# Patient Record
Sex: MALE | Race: WHITE | URBAN - METROPOLITAN AREA
[De-identification: names, ages, dates, MRNs, and addresses within clinical notes are randomized per-mention and may not be internally consistent; named-entity substitution may affect disease eponyms.]

---

## 2017-03-06 ENCOUNTER — OFFICE VISIT (OUTPATIENT)
Dept: PODIATRY | Facility: CLINIC | Age: 67
End: 2017-03-06
Payer: COMMERCIAL

## 2017-03-06 VITALS
BODY MASS INDEX: 28.98 KG/M2 | DIASTOLIC BLOOD PRESSURE: 76 MMHG | WEIGHT: 238 LBS | SYSTOLIC BLOOD PRESSURE: 122 MMHG | HEIGHT: 76 IN

## 2017-03-06 DIAGNOSIS — M20.42 HAMMER TOES OF BOTH FEET: ICD-10-CM

## 2017-03-06 DIAGNOSIS — M79.672 FOOT PAIN, LEFT: ICD-10-CM

## 2017-03-06 DIAGNOSIS — M20.41 HAMMER TOES OF BOTH FEET: ICD-10-CM

## 2017-03-06 DIAGNOSIS — Q66.72 PES CAVUS OF LEFT FOOT: ICD-10-CM

## 2017-03-06 DIAGNOSIS — L84 CALLUS OF FOOT: Primary | ICD-10-CM

## 2017-03-06 PROCEDURE — 99203 OFFICE O/P NEW LOW 30 MIN: CPT | Performed by: PODIATRIST

## 2017-03-06 NOTE — NURSING NOTE
"Chief Complaint   Patient presents with     Foot Problems     plantar wart x1.5 years of left foot        Initial /76 (BP Location: Right arm, Cuff Size: Adult Regular)  Ht 6' 4\" (1.93 m)  Wt 238 lb (108 kg)  BMI 28.97 kg/m2 Estimated body mass index is 28.97 kg/(m^2) as calculated from the following:    Height as of this encounter: 6' 4\" (1.93 m).    Weight as of this encounter: 238 lb (108 kg).  Medication Reconciliation: complete   Danielle Harris MA      "

## 2017-03-06 NOTE — MR AVS SNAPSHOT
After Visit Summary   3/6/2017    Ramez Fairchild    MRN: [id number]           Patient Information     Date Of Birth          1950        Visit Information        Provider Department      3/6/2017 9:15 AM Dwain Sunshine DPM [facility]        Today's Diagnoses     Callus of foot    -  1    Foot pain, left        Pes cavus of left foot        Hammer toes of both feet          Care Instructions    LUIS ALFREDO SHOES LOCATIONS    [place]  [address] [place] [address]  [phone]   [address]  [phone] [place]  [address] [facility]  [phone]   [place]  [address]  [phone]   [place]  [address]  [phone] [place]  [address] [place] [address] [place].  [phone]   [place]  [address]  [phone]   [place]  [address]  [phone] [place]  [address] [facility] [phone]         Calluses, Corns, IPKs, Porokeratosis    When there is excessive friction or pressure on the skin, the body responds by making the skin thicker to protect the deeper structures from becoming exposed. While this works well to protect the deeper structures, the thickened skin can increase pressure and pain.    Flat, diffuse thickening are simple calluses and they are usually caused by friction.  Often these are the result of rubbing on a shoe or going barefoot.    Calluses with a central core between the toes are called corns. These result from prominent joints on adjacent toes rubbing together. Theses are a symptom of bone malalignment and will always recur unless the underlying bones are addressed surgically.    Calluses with a central core on the ball of the foot are usually IPKs (intractable plantar keratosis). These are caused by excessive pressure from the metatarsals, the bones that make up the ball of the foot. Often one of these bones is too long or too prominent.  Again, these will always recur unless the  underlying bone issue is addressed. There is no cure for these. They will either go away by themselves, recur, or more could develop.    Regardless of what the diagnosis, most of these lesions can be kept comfortable with routine maintenance.   1. File them down with a pumice stone or callus file a couple times a week.   2. An electric callus removing device. Amope Pedi Perfect Electronic Pedicure Foot File and Callus Remover can be a good option.   3. Lotion can be applied to soften the callus. A urea based cream such as Kersal or Vanicream or thicker cream with shea butter are good options.  4. Toe spacers or toe covers can be used for corns, gel pads can be used for other lesions on the bottom of the foot.   If there is a surgical pathology noted, such as a prominent bone, often this needs to be addressed surgically to minimize recurrence. However, sometimes the lesion simply migrates to another spot after surgery, so it is not a guaranteed cure.         DR. OLIVIA'S SCHEDULE:        Monday & Friday AM - [facility] Wednesday - [facility]   [address] [place] [address] [address] [place] [address]   P: [phone] P: [phone]   F: [phone] F:[phone]       Tuesday - Surgery Thursday [facility]   [phone] [address] [place] [address]   Appointment Scheduling Line: P: [phone] F: [phone]     FYI: Our new schedule at [place] on Wednesday is from 7 AM - 2 PM.        Body Mass Index (BMI)    Many things can cause foot and ankle problems. Foot structure, activity level, foot mechanics and injuries are common causes of pain.    One very important issue that often goes unmentioned, is body weight.  Extra weight can cause increased stress on muscles, ligaments, bones and tendons. Sometimes just a few extra pounds is all it takes to put one over her/his threshold. Without reducing that stress, it can be difficult to  "alleviate pain.      Some people are uncomfortable addressing this issue, but we feel it is important for you to think about it. As Foot & Ankle specialists, our job is addressing the lower extremity problem and possible causes.     Regarding extra body weight, we encourage patients to discuss diet and weight management plans with their primary care doctors. It is this team approach that gives you the best opportunity for pain relief and getting you back on your feet.              Follow-ups after your visit        Who to contact     If you have questions or need follow up information about today's clinic visit or your schedule please contact [facility] directly at [phone].  Normal or non-critical lab and imaging results will be communicated to you by MyChart, letter or phone within 4 business days after the clinic has received the results. If you do not hear from us within 7 days, please contact the clinic through nuvoTVhart or phone. If you have a critical or abnormal lab result, we will notify you by phone as soon as possible.  Submit refill requests through path intelligence or call your pharmacy and they will forward the refill request to us. Please allow 3 business days for your refill to be completed.          Additional Information About Your Visit        nuvoTVhart Information     path intelligence gives you secure access to your electronic health record. If you see a primary care provider, you can also send messages to your care team and make appointments. If you have questions, please call your primary care clinic.  If you do not have a primary care provider, please call [phone] and they will assist you.        Care EveryWhere ID     This is your Care EveryWhere ID. This could be used by other organizations to access your [facility] medical records  [id number]        Your Vitals Were     Height BMI (Body Mass Index)                6' 4\" (1.93 m) 28.97 kg/m2           Blood Pressure from Last 3 " Encounters:   03/06/17 122/76   11/17/16 126/78   06/07/16 118/82    Weight from Last 3 Encounters:   03/06/17 238 lb (108 kg)   11/17/16 238 lb (108 kg)   06/07/16 233 lb 1.6 oz (105.7 kg)              Today, you had the following     No orders found for display       Primary Care Provider Office Phone # Fax #    Anand Alexander [phone]       [facility] [address] [facility] [address]        Thank you!     Thank you for choosing [facility]  for your care. Our goal is always to provide you with excellent care. Hearing back from our patients is one way we can continue to improve our services. Please take a few minutes to complete the written survey that you may receive in the mail after your visit with us. Thank you!             Your Updated Medication List - Protect others around you: Learn how to safely use, store and throw away your medicines at www.disposemymeds.org.          This list is accurate as of: 3/6/17  9:31 AM.  Always use your most recent med list.                   Brand Name Dispense Instructions for use    ALAVERT PO      as needed       albuterol 108 (90 BASE) MCG/ACT Inhaler    albuterol    1 Inhaler    Inhale 2 puffs into the lungs every 4 hours as needed for shortness of breath / dyspnea or wheezing       aspirin 81 MG tablet      1 TABLET DAILY       aspirin-acetaminophen-caffeine 250-250-65 MG per tablet    EXCEDRIN MIGRAINE     Take 1 tablet by mouth every 6 hours as needed       desonide 0.05 % cream    DESOWEN    30 g    Apply topically 2 times daily Apply sparingly once or twice per day as needed to affected area until the skin is better, then stop       losartan-hydrochlorothiazide 50-12.5 MG per tablet    HYZAAR    90 tablet    Take 1 tablet by mouth daily       montelukast 10 MG tablet    SINGULAIR    90 tablet    Take 1 tablet (10 mg) by mouth At Bedtime TAKE ONCE DAILY DURING THE ALLERGY SEASON(S)        multivitamin, therapeutic Tabs tablet      Take 1 tablet by mouth daily       potassium chloride SA 20 MEQ CR tablet    potassium chloride    90 tablet    Take 1 tablet (20 mEq) by mouth daily       pravastatin 40 MG tablet    PRAVACHOL    90 tablet    Take 1 tablet (40 mg) by mouth At Bedtime       SAW PALMETTO PO      Take 1 tablet by mouth daily       sildenafil 100 MG cap/tab    VIAGRA    6 tablet    Take 0.5-1 tablets ( mg) by mouth daily as needed for erectile dysfunction       triamcinolone 0.1 % cream    KENALOG     Apply sparingly once or twice per day as needed to affected area until the skin is better, then stop       valACYclovir 1000 mg tablet    VALTREX    4 tablet    2 tablets twice per day for 2 doses as needed for cold sores       VITAMIN C PO      Take 500 mg by mouth daily

## 2017-03-06 NOTE — PROGRESS NOTES
ASSESSMENT/PLAN:    Encounter Diagnoses   Name Primary?     Callus of foot Yes     Foot pain, left      Pes cavus of left foot      Hammer toes of both feet      I paired down/ cored out the callus, left foot (intractable plantar keratoma).  He is to to use a pumice stone.  Trimming is at his own risk.      We discussed how his foot structure contributes to the lesion.   I recommended stiffer soled shoes and a soft insert with an aperture cut below the lesion.     Crest pad provided to elevate the tip of the left 2nd toe.     Body mass index is 28.97 kg/(m^2).    Weight management plan: Patient was referred to their PCP to discuss a diet and exercise plan.      Dwain Sunshine DPM, FACFAS, MS    [facility] Department of Podiatry/Foot & Ankle Surgery      ____________________________________________________________________    HPI:         Chief Complaint: left foot pain associated with a plantar wart  Onset of problem: 15 months  Pain/ discomfort is described as:  Burning and throbbing  Ratin/10   Frequency:  daily    The pain is made worse with all weight bearing  Previous treatment: filing and an over the counter wart removal medication  *  Past Medical History   Diagnosis Date     Essential hypertension, benign      Hypertension, Benign     Hyperlipidemia LDL goal < 100      Impotence of organic origin      Mild concentric left ventricular hypertrophy (LVH) 3/5/15     mild concentric LVH per ECHO     Seasonal allergies      fall mainly     Simplex, oral herpes      1-2 times per year, Valtrex works well   *  *  Past Surgical History   Procedure Laterality Date     Arthroscopy knee rt/lt  , ,      right knee     C nonspecific procedure       veins stripped in both legs     C nonspecific procedure       node taken off bladder     Vasectomy       Colonoscopy  04     normal colonoscopy at [facility]   *  *  Current Outpatient Prescriptions   Medication Sig Dispense Refill      losartan-hydrochlorothiazide (HYZAAR) 50-12.5 MG per tablet Take 1 tablet by mouth daily 90 tablet 3     potassium chloride SA (POTASSIUM CHLORIDE) 20 MEQ tablet Take 1 tablet (20 mEq) by mouth daily 90 tablet 3     montelukast (SINGULAIR) 10 MG tablet Take 1 tablet (10 mg) by mouth At Bedtime TAKE ONCE DAILY DURING THE ALLERGY SEASON(S) 90 tablet 1     albuterol (ALBUTEROL) 108 (90 BASE) MCG/ACT inhaler Inhale 2 puffs into the lungs every 4 hours as needed for shortness of breath / dyspnea or wheezing 1 Inhaler 11     pravastatin (PRAVACHOL) 40 MG tablet Take 1 tablet (40 mg) by mouth At Bedtime 90 tablet 3     sildenafil (VIAGRA) 100 MG tablet Take 0.5-1 tablets ( mg) by mouth daily as needed for erectile dysfunction 6 tablet 10     triamcinolone (KENALOG) 0.1 % cream Apply sparingly once or twice per day as needed to affected area until the skin is better, then stop       valACYclovir (VALTREX) 1000 mg tablet 2 tablets twice per day for 2 doses as needed for cold sores 4 tablet 5     multivitamin, therapeutic (THERA-VIT) TABS Take 1 tablet by mouth daily       Ascorbic Acid (VITAMIN C PO) Take 500 mg by mouth daily       Saw Palmetto, Serenoa repens, (SAW PALMETTO PO) Take 1 tablet by mouth daily       aspirin-acetaminophen-caffeine (EXCEDRIN MIGRAINE) 250-250-65 MG per tablet Take 1 tablet by mouth every 6 hours as needed       desonide (DESOWEN) 0.05 % cream Apply topically 2 times daily Apply sparingly once or twice per day as needed to affected area until the skin is better, then stop 30 g 1     ASPIRIN 81 MG OR TABS 1 TABLET DAILY       ALAVERT OR as needed         ROS:     A 10-point review of systems was performed and is positive for that noted in the HPI and as seen below.  All other areas are negative.     Numbness in feet?  no   Calf pain with walking? no  Recent foot/ankle injury? no  Weight change over past 12 months? no  Self perception as overweight? no  Recent flu-like symptoms? no  Joint  "pain other than feet ? Back and finger joints - arthritis    Social History: Employment:  retired;  Exercise/Physical activity:  walking;  Tobacco use:  no  Social History     Social History     Marital status:      Spouse name: N/A     Number of children: N/A     Years of education: N/A     Occupational History     CPA [employer]     Social History Main Topics     Smoking status: Never Smoker     Smokeless tobacco: Never Used     Alcohol use Yes      Comment: socially on weekend     Drug use: No     Sexual activity: Yes     Partners: Female     Other Topics Concern     Not on file     Social History Narrative       Family history:  Family History   Problem Relation Age of Onset     CANCER Father      lung cancer (smoker)     Breast Cancer Paternal Grandmother      Breast Cancer Sister      b. 1955     Family History Negative Mother      b 1928     CANCER Paternal Uncle      pancreatic cancer       Rheumatoid arthritis:  no  Foot Problems: no  Diabetes: no      EXAM:    Vitals: /76 (BP Location: Right arm, Cuff Size: Adult Regular)  Ht 6' 4\" (1.93 m)  Wt 238 lb (108 kg)  BMI 28.97 kg/m2  BMI: Body mass index is 28.97 kg/(m^2).  Height: 6' 4\"    Constitutional/ general:  Pt is in no apparent distress, appears well-nourished.  Cooperative with history and physical exam.     Lungs:  Non labored breathing, non labored speech. No cough.  No audible wheezing. Even, quiet breathing.       Vascular:  Pedal pulses are palpable bilaterally for both the DP and PT arteries.  CFT < 3 sec.  No edema.  Pedal hair growth noted.     Neuro:  Alert and oriented x 3. Coordinated gait.  Light touch sensation is intact to the L4, L5, S1 distributions. No obvious deficits.  No evidence of neurological-based weakness, spasticity, or contracture in the lower extremities.     Derm: Normal texture and turgor.  No erythema, ecchymosis, or cyanosis.  No open lesions.  Thick, deep, nucleated hyperkeratotic lesion " sub left 3rd metatarsophalangeal joint.  Hyperkeratotic lesion distal left 2nd toe    Musculoskeletal:    Lower extremity muscle strength is normal.  Patient is ambulatory without an assistive device or brace .  High medial longitudinal arches.  Digital contractures - not fully reducible    Dwain Sunshine DPM, FACFAS, MS    [facility] Department of Podiatry/Foot & Ankle Surgery

## 2017-03-06 NOTE — PATIENT INSTRUCTIONS
LUIS ALFREDO SHOES LOCATIONS    [place]  [address] [facility]  [phone]   [address]  [phone] [place]  [address] [facility]  [phone]   [place]  [address] [facility] [address]  [phone]   [place]  [address]  [phone] [place]  [address] [facility] [address]  [phone]   [place]  [address]  [phone]   [place]  [address]  [phone] [place]  [address] [phone]         Calluses, Corns, IPKs, Porokeratosis    When there is excessive friction or pressure on the skin, the body responds by making the skin thicker to protect the deeper structures from becoming exposed. While this works well to protect the deeper structures, the thickened skin can increase pressure and pain.    Flat, diffuse thickening are simple calluses and they are usually caused by friction.  Often these are the result of rubbing on a shoe or going barefoot.    Calluses with a central core between the toes are called corns. These result from prominent joints on adjacent toes rubbing together. Theses are a symptom of bone malalignment and will always recur unless the underlying bones are addressed surgically.    Calluses with a central core on the ball of the foot are usually IPKs (intractable plantar keratosis). These are caused by excessive pressure from the metatarsals, the bones that make up the ball of the foot. Often one of these bones is too long or too prominent.  Again, these will always recur unless the underlying bone issue is addressed. There is no cure for these. They will either go away by themselves, recur, or more could develop.    Regardless of what the diagnosis, most of these lesions can be kept comfortable with routine maintenance.   1. File them down with a pumice stone or callus file a couple times a week.   2. An electric callus removing device. Amope Pedi Perfect Electronic Pedicure Foot File and Callus Remover can be a good  option.   3. Lotion can be applied to soften the callus. A urea based cream such as Kersal or Vanicream or thicker cream with shea butter are good options.  4. Toe spacers or toe covers can be used for corns, gel pads can be used for other lesions on the bottom of the foot.   If there is a surgical pathology noted, such as a prominent bone, often this needs to be addressed surgically to minimize recurrence. However, sometimes the lesion simply migrates to another spot after surgery, so it is not a guaranteed cure.         DR. OLIVIA'S SCHEDULE:        Monday & Friday AM - [facility] Wednesday - [facility]   [address] [place] [address] [place] [address]   P: [phone] P: [phone]   F: [phone] F:[phone]       Tuesday - Surgery Thursday - [facility]   [phone] [address] [place] [address]   Appointment Scheduling Line: P: [phone] F: [phone]     FYI: Our new schedule at [place] on Wednesday is from 7 AM - 2 PM.        Body Mass Index (BMI)    Many things can cause foot and ankle problems. Foot structure, activity level, foot mechanics and injuries are common causes of pain.    One very important issue that often goes unmentioned, is body weight.  Extra weight can cause increased stress on muscles, ligaments, bones and tendons. Sometimes just a few extra pounds is all it takes to put one over her/his threshold. Without reducing that stress, it can be difficult to alleviate pain.      Some people are uncomfortable addressing this issue, but we feel it is important for you to think about it. As Foot & Ankle specialists, our job is addressing the lower extremity problem and possible causes.     Regarding extra body weight, we encourage patients to discuss diet and weight management plans with their primary care doctors. It is this team approach that gives you the best opportunity for pain relief and getting  you back on your feet.

## 2017-07-10 DIAGNOSIS — N52.9 IMPOTENCE OF ORGANIC ORIGIN: ICD-10-CM

## 2017-07-10 NOTE — TELEPHONE ENCOUNTER
sildenafil      Last Written Prescription Date: 10/13/15  Last Fill Quantity: 6 tabs,  # refills: 10   Last Office Visit with G, [facility] or [facility] prescribing provider: 03/06/17

## 2017-07-11 RX ORDER — SILDENAFIL 100 MG/1
50-100 TABLET, FILM COATED ORAL DAILY PRN
Qty: 6 TABLET | Refills: 9 | Status: SHIPPED | OUTPATIENT
Start: 2017-07-11 | End: 2017-07-18 | Stop reason: ALTCHOICE

## 2017-07-12 ENCOUNTER — ALLIED HEALTH/NURSE VISIT (OUTPATIENT)
Dept: NURSING | Facility: CLINIC | Age: 67
End: 2017-07-12
Payer: COMMERCIAL

## 2017-07-12 DIAGNOSIS — Z23 NEED FOR VACCINATION: Primary | ICD-10-CM

## 2017-07-12 PROCEDURE — 90670 PCV13 VACCINE IM: CPT

## 2017-07-12 PROCEDURE — 90736 HZV VACCINE LIVE SUBQ: CPT

## 2017-07-12 PROCEDURE — 90472 IMMUNIZATION ADMIN EACH ADD: CPT

## 2017-07-12 PROCEDURE — 90471 IMMUNIZATION ADMIN: CPT

## 2017-07-12 PROCEDURE — 99207 ZZC NO CHARGE NURSE ONLY: CPT

## 2017-07-12 NOTE — NURSING NOTE
Screening Questionnaire for Adult Immunization    Are you sick today?   No   Do you have allergies to medications, food, a vaccine component or latex?   No   Have you ever had a serious reaction after receiving a vaccination?   No   Do you have a long-term health problem with heart disease, lung disease, asthma, kidney disease, metabolic disease (e.g. diabetes), anemia, or other blood disorder?   No   Do you have cancer, leukemia, HIV/AIDS, or any other immune system problem?   No   In the past 3 months, have you taken medications that affect  your immune system, such as prednisone, other steroids, or anticancer drugs; drugs for the treatment of rheumatoid arthritis, Crohn s disease, or psoriasis; or have you had radiation treatments?   No   Have you had a seizure, or a brain or other nervous system problem?   No   During the past year, have you received a transfusion of blood or blood     products, or been given immune (gamma) globulin or antiviral drug?   No   For women: Are you pregnant or is there a chance you could become        pregnant during the next month?   N/A   Have you received any vaccinations in the past 4 weeks?   No     Immunization questionnaire answers were all negative.      [facility] does apply for the following reason:  Insured: Has insurance that covers the cost of all vaccines (Not [facility] elligible because insurance already covers all vaccines)    Per orders of Dr. Alexander, injection of Shingles and Prevnar 13 given by Sarika Heredia. Patient instructed to remain in clinic for 15 minutes afterwards, and to report any adverse reaction to me immediately.       Screening performed by Sarika Heredia on 7/12/2017 at 1:53 PM.

## 2017-07-12 NOTE — MR AVS SNAPSHOT
After Visit Summary   7/12/2017    Ramez Fairchild    MRN: [id number]           Patient Information     Date Of Birth          1950        Visit Information        Provider Department      7/12/2017 1:30 PM [facility] - NURSE [facility]        Today's Diagnoses     Need for vaccination    -  1       Follow-ups after your visit        Your next 10 appointments already scheduled     Jul 18, 2017  8:40 AM CDT   Office Visit with Anand Alexander MD   [facility] ([facility]    [address] [address] [phone]           Bring a current list of meds and any records pertaining to this visit.  For Physicals, please bring immunization records and any forms needing to be filled out.  Please arrive 10 minutes early to complete paperwork.              Who to contact     If you have questions or need follow up information about today's clinic visit or your schedule please contact [facility] directly at [phone].  Normal or non-critical lab and imaging results will be communicated to you by Scaled Inferencehart, letter or phone within 4 business days after the clinic has received the results. If you do not hear from us within 7 days, please contact the clinic through Lit Motorst or phone. If you have a critical or abnormal lab result, we will notify you by phone as soon as possible.  Submit refill requests through DriverSide or call your pharmacy and they will forward the refill request to us. Please allow 3 business days for your refill to be completed.          Additional Information About Your Visit        Scaled Inferencehart Information     DriverSide gives you secure access to your electronic health record. If you see a primary care provider, you can also send messages to your care team and make appointments. If you have questions, please call your primary care clinic.  If you do not have a primary  care provider, please call [phone] and they will assist you.        Care EveryWhere ID     This is your Care EveryWhere ID. This could be used by other organizations to access your [facility] medical records  [id number]         Blood Pressure from Last 3 Encounters:   03/06/17 122/76   11/17/16 126/78   06/07/16 118/82    Weight from Last 3 Encounters:   03/06/17 238 lb (108 kg)   11/17/16 238 lb (108 kg)   06/07/16 233 lb 1.6 oz (105.7 kg)              We Performed the Following     Pneumococcal vaccine 13 valent PCV13 IM (Prevnar) [28898]     ZOSTER VACCINE LIVE SUB-Q NJX [39560]        Primary Care Provider Office Phone # Fax #    Anand Alexander [phone]       [facility] [address] [facility] [address]        Equal Access to Services     MELANY LÓPEZ : Hadii aad ku hadasho Soomaali, waaxda luqadaha, qaybta kaalmada adeegyada, waxay idiin hayaan andie cabrera . So [facility] [phone].    ATENCIÓN: Si habla español, tiene a ospina disposición servicios gratuitos de asistencia lingüística. Eliceo al [phone].    We comply with applicable federal civil rights laws and [place] laws. We do not discriminate on the basis of race, color, national origin, age, disability sex, sexual orientation or gender identity.            Thank you!     Thank you for choosing [facility]  for your care. Our goal is always to provide you with excellent care. Hearing back from our patients is one way we can continue to improve our services. Please take a few minutes to complete the written survey that you may receive in the mail after your visit with us. Thank you!             Your Updated Medication List - Protect others around you: Learn how to safely use, store and throw away your medicines at www.disposemymeds.org.          This list is accurate as of: 7/12/17  2:23 PM.  Always use your most recent med list.                   Brand Name Dispense  Instructions for use Diagnosis    ALAVERT PO      as needed    Rhinitis, allergic seasonal       albuterol 108 (90 BASE) MCG/ACT Inhaler    albuterol    1 Inhaler    Inhale 2 puffs into the lungs every 4 hours as needed for shortness of breath / dyspnea or wheezing    Bronchospasm       aspirin 81 MG tablet      1 TABLET DAILY    Essential hypertension, benign       aspirin-acetaminophen-caffeine 250-250-65 MG per tablet    EXCEDRIN MIGRAINE     Take 1 tablet by mouth every 6 hours as needed        desonide 0.05 % cream    DESOWEN    30 g    Apply topically 2 times daily Apply sparingly once or twice per day as needed to affected area until the skin is better, then stop    Dermatitis       losartan-hydrochlorothiazide 50-12.5 MG per tablet    HYZAAR    90 tablet    Take 1 tablet by mouth daily    Essential hypertension, benign       montelukast 10 MG tablet    SINGULAIR    90 tablet    Take 1 tablet (10 mg) by mouth At Bedtime TAKE ONCE DAILY DURING THE ALLERGY SEASON(S)    Seasonal allergic rhinitis, unspecified allergic rhinitis trigger, Chronic cough       multivitamin, therapeutic Tabs tablet      Take 1 tablet by mouth daily        potassium chloride SA 20 MEQ CR tablet    potassium chloride    90 tablet    Take 1 tablet (20 mEq) by mouth daily    Essential hypertension, benign       pravastatin 40 MG tablet    PRAVACHOL    90 tablet    Take 1 tablet (40 mg) by mouth At Bedtime    Hyperlipidemia LDL goal <130       SAW PALMETTO PO      Take 1 tablet by mouth daily        sildenafil 100 MG cap/tab    VIAGRA    6 tablet    Take 0.5-1 tablets ( mg) by mouth daily as needed for erectile dysfunction    Impotence of organic origin       triamcinolone 0.1 % cream    KENALOG     Apply sparingly once or twice per day as needed to affected area until the skin is better, then stop    Dermatitis       valACYclovir 1000 mg tablet    VALTREX    4 tablet    2 tablets twice per day for 2 doses as needed for cold sores     Oral herpes simplex infection       VITAMIN C PO      Take 500 mg by mouth daily

## 2017-07-18 ENCOUNTER — OFFICE VISIT (OUTPATIENT)
Dept: INTERNAL MEDICINE | Facility: CLINIC | Age: 67
End: 2017-07-18
Payer: COMMERCIAL

## 2017-07-18 VITALS
BODY MASS INDEX: 27.16 KG/M2 | DIASTOLIC BLOOD PRESSURE: 88 MMHG | OXYGEN SATURATION: 97 % | TEMPERATURE: 98.2 F | HEART RATE: 64 BPM | WEIGHT: 223.1 LBS | SYSTOLIC BLOOD PRESSURE: 138 MMHG

## 2017-07-18 DIAGNOSIS — Z11.59 NEED FOR HEPATITIS C SCREENING TEST: ICD-10-CM

## 2017-07-18 DIAGNOSIS — E78.5 HYPERLIPIDEMIA LDL GOAL <130: ICD-10-CM

## 2017-07-18 DIAGNOSIS — Z12.5 SPECIAL SCREENING FOR MALIGNANT NEOPLASM OF PROSTATE: ICD-10-CM

## 2017-07-18 DIAGNOSIS — N52.9 ERECTILE DYSFUNCTION, UNSPECIFIED ERECTILE DYSFUNCTION TYPE: ICD-10-CM

## 2017-07-18 DIAGNOSIS — I10 ESSENTIAL HYPERTENSION, BENIGN: Primary | ICD-10-CM

## 2017-07-18 PROCEDURE — 99213 OFFICE O/P EST LOW 20 MIN: CPT | Performed by: INTERNAL MEDICINE

## 2017-07-18 RX ORDER — SILDENAFIL CITRATE 20 MG/1
40-60 TABLET ORAL DAILY PRN
Qty: 30 TABLET | Refills: 5 | Status: SHIPPED | OUTPATIENT
Start: 2017-07-18

## 2017-07-18 NOTE — NURSING NOTE
"Chief Complaint   Patient presents with     Hypertension     f/u     Lipids     f/u       Initial /90  Pulse 64  Temp 98.2  F (36.8  C) (Oral)  Wt 223 lb 1.6 oz (101.2 kg)  SpO2 97%  BMI 27.16 kg/m2 Estimated body mass index is 27.16 kg/(m^2) as calculated from the following:    Height as of 3/6/17: 6' 4\" (1.93 m).    Weight as of this encounter: 223 lb 1.6 oz (101.2 kg).  Medication Reconciliation: complete   Irma Blevins CMA      "

## 2017-07-18 NOTE — MR AVS SNAPSHOT
"              After Visit Summary   7/18/2017    Ramez Fairchild    MRN: [id number]           Patient Information     Date Of Birth          1950        Visit Information        Provider Department      7/18/2017 8:40 AM Anand Alexander MD [facility]        Today's Diagnoses     Essential hypertension, benign    -  1    Erectile dysfunction, unspecified erectile dysfunction type        Hyperlipidemia LDL goal <130        Need for hepatitis C screening test        Special screening for malignant neoplasm of prostate          Care Instructions    *  Continue all medications at the same doses.  Contact your usual pharmacy if you need refills.     *  Return to see me in November 2017, sooner if needed.  Please get fasting labs done at the [facility] or any other [facility] lab 1-2 days before this appointment.  If you get the labs done at another [facility], make arrangements with them directly.  The orders will be in place.  Eat nothing for at least 8 hours prior to having these labs drawn.  Call [phone] to schedule appointments at [facility].       5 GOALS TO PREVENT VASCULAR DISEASE:     1.  Aggressive blood pressure control, under 130/80 ideally.  Using medications if needed.    Your blood pressure is under good control    BP Readings from Last 4 Encounters:   07/18/17 138/88   03/06/17 122/76   11/17/16 126/78   06/07/16 118/82       2.  Aggressive LDL cholesterol (\"bad cholesterol\") lowering as indicated.    Your goal is an LDL under 130 for sure, preferably under 100.  (If you have diabetes or previous vascular disease, the the LDL goals would be under 100 for sure, preferably under 70.)    New guidelines identify four high-risk groups who could benefit from statins:   *people with pre-existing heart disease, such as those who have had a heart attack;   *people ages 40 to 75 who have diabetes of any type  *patients ages 40 to 75 with at " "least a 7.5% risk of developing cardiovascular disease over the next decade, according to a formula described in the guidelines  *patients with the sort of super-high cholesterol that sometimes runs in families, as evidenced by an LDL of 190 milligrams per deciliter or higher    Your cholesterol levels are well controlled.    Recent Labs   Lab Test  11/10/16   0814  09/24/15   0817  03/04/15   0737   CHOL  185  182  168   HDL  42  42  41   LDL  121*  113  111   TRIG  111  135  78   CHOLHDLRATIO   --   4.3  4.1       3.  Aggressive diabetic prevention, screening and/or management.      You do not have diabetes as of the most recent blood tests.     4.  No smoking    5.  Consider taking low dose aspirin (81 mg) tablet once per day over the age of 50, every day unless there is a specific reason that you cannot take aspirin (such as side effect, allergy, or you are on another \"blood thinner\").        --Based on your current cardiac risk factors, you should take Aspirin 81 mg once per day if you are over 50 years of age.                 Follow-ups after your visit        Future tests that were ordered for you today     Open Future Orders        Priority Expected Expires Ordered    CBC with platelets and differential Routine 11/10/2017 1/18/2018 7/18/2017    Comprehensive metabolic panel (BMP + Alb, Alk Phos, ALT, AST, Total. Bili, TP) Routine 11/10/2017 1/18/2018 7/18/2017    PSA, screen Routine 11/10/2017 1/18/2018 7/18/2017    **Hepatitis C Screen Reflex to RNA FUTURE anytime Routine 11/10/2017 1/18/2018 7/18/2017    Lipid panel reflex to direct LDL Routine 11/10/2017 1/18/2018 7/18/2017            Who to contact     If you have questions or need follow up information about today's clinic visit or your schedule please contact [facility] directly at [phone].  Normal or non-critical lab and imaging results will be communicated to you by MyChart, letter or phone within 4 business days after " the clinic has received the results. If you do not hear from us within 7 days, please contact the clinic through Etherpad or phone. If you have a critical or abnormal lab result, we will notify you by phone as soon as possible.  Submit refill requests through Etherpad or call your pharmacy and they will forward the refill request to us. Please allow 3 business days for your refill to be completed.          Additional Information About Your Visit        Etherpad Information     Etherpad gives you secure access to your electronic health record. If you see a primary care provider, you can also send messages to your care team and make appointments. If you have questions, please call your primary care clinic.  If you do not have a primary care provider, please call [phone] and they will assist you.        Care EveryWhere ID     This is your Care EveryWhere ID. This could be used by other organizations to access your [facility] medical records  [id number]        Your Vitals Were     Pulse Temperature Pulse Oximetry BMI (Body Mass Index)          64 98.2  F (36.8  C) (Oral) 97% 27.16 kg/m2         Blood Pressure from Last 3 Encounters:   07/18/17 138/88   03/06/17 122/76   11/17/16 126/78    Weight from Last 3 Encounters:   07/18/17 223 lb 1.6 oz (101.2 kg)   03/06/17 238 lb (108 kg)   11/17/16 238 lb (108 kg)                 Today's Medication Changes          These changes are accurate as of: 7/18/17  9:29 AM.  If you have any questions, ask your nurse or doctor.               Start taking these medicines.        Dose/Directions    sildenafil 20 MG tablet   Commonly known as:  REVATIO/VIAGRA   Used for:  Erectile dysfunction, unspecified erectile dysfunction type   Started by:  Anand Alexander MD        Dose:  40-60 mg   Take 2-3 tablets (40-60 mg) by mouth daily as needed Never use with nitroglycerin, terazosin or doxazosin.   Quantity:  30 tablet   Refills:  5         Stop taking these medicines if you  haven't already. Please contact your care team if you have questions.     sildenafil 100 MG cap/tab   Commonly known as:  VIAGRA   Stopped by:  Anand Alexander MD                Where to get your medicines      Some of these will need a paper prescription and others can be bought over the counter.  Ask your nurse if you have questions.     Bring a paper prescription for each of these medications     sildenafil 20 MG tablet                Primary Care Provider Office Phone # Fax #    Anand Alexander [phone]       [facility] [address] [facility] [address]        Equal Access to Services     [facility]: Hadii aad ku hadasho Soomaali, waaxda luqadaha, qaybta kaalmada adeegyada, waxay sabrinain hayaan andie cabrera . So [facility] [phone].    ATENCIÓN: Si habla español, tiene a ospina disposición servicios gratuitos de asistencia lingüística. [facility] [phone].    We comply with applicable federal civil rights laws and [place] laws. We do not discriminate on the basis of race, color, national origin, age, disability sex, sexual orientation or gender identity.            Thank you!     Thank you for choosing [facility]  for your care. Our goal is always to provide you with excellent care. Hearing back from our patients is one way we can continue to improve our services. Please take a few minutes to complete the written survey that you may receive in the mail after your visit with us. Thank you!             Your Updated Medication List - Protect others around you: Learn how to safely use, store and throw away your medicines at www.disposemymeds.org.          This list is accurate as of: 7/18/17  9:29 AM.  Always use your most recent med list.                   Brand Name Dispense Instructions for use Diagnosis    ALAVERT PO      as needed    Rhinitis, allergic seasonal       albuterol 108 (90 BASE) MCG/ACT Inhaler    albuterol    1  Inhaler    Inhale 2 puffs into the lungs every 4 hours as needed for shortness of breath / dyspnea or wheezing    Bronchospasm       aspirin 81 MG tablet      1 TABLET DAILY    Essential hypertension, benign       aspirin-acetaminophen-caffeine 250-250-65 MG per tablet    EXCEDRIN MIGRAINE     Take 1 tablet by mouth every 6 hours as needed        desonide 0.05 % cream    DESOWEN    30 g    Apply topically 2 times daily Apply sparingly once or twice per day as needed to affected area until the skin is better, then stop    Dermatitis       losartan-hydrochlorothiazide 50-12.5 MG per tablet    HYZAAR    90 tablet    Take 1 tablet by mouth daily    Essential hypertension, benign       montelukast 10 MG tablet    SINGULAIR    90 tablet    Take 1 tablet (10 mg) by mouth At Bedtime TAKE ONCE DAILY DURING THE ALLERGY SEASON(S)    Seasonal allergic rhinitis, unspecified allergic rhinitis trigger, Chronic cough       multivitamin, therapeutic Tabs tablet      Take 1 tablet by mouth daily        potassium chloride SA 20 MEQ CR tablet    potassium chloride    90 tablet    Take 1 tablet (20 mEq) by mouth daily    Essential hypertension, benign       pravastatin 40 MG tablet    PRAVACHOL    90 tablet    Take 1 tablet (40 mg) by mouth At Bedtime    Hyperlipidemia LDL goal <130       SAW PALMETTO PO      Take 1 tablet by mouth daily        sildenafil 20 MG tablet    REVATIO/VIAGRA    30 tablet    Take 2-3 tablets (40-60 mg) by mouth daily as needed Never use with nitroglycerin, terazosin or doxazosin.    Erectile dysfunction, unspecified erectile dysfunction type       triamcinolone 0.1 % cream    KENALOG     Apply sparingly once or twice per day as needed to affected area until the skin is better, then stop    Dermatitis       valACYclovir 1000 mg tablet    VALTREX    4 tablet    2 tablets twice per day for 2 doses as needed for cold sores    Oral herpes simplex infection       VITAMIN C PO      Take 500 mg by mouth daily

## 2017-07-18 NOTE — PROGRESS NOTES
SUBJECTIVE:                                                    Ramez Fairchild is a 67 year old male who presents to clinic today for the following health issues:      Hyperlipidemia Follow-Up      Rate your low fat/cholesterol diet?: good    Taking statin?  Yes, possible muscle aches from statin, takes statin twiceda week    Other lipid medications/supplements?:  none    Hypertension Follow-up      Outpatient blood pressures are being checked at home.  Results are 130's/80's.    Low Salt Diet: no added salt    Blood presure remains well controlled at home  Readings outside clinic are within normal limits.  Reviewed last 6 BP readings in chart:  BP Readings from Last 6 Encounters:   07/18/17 138/88   03/06/17 122/76   11/17/16 126/78   06/07/16 118/82   03/25/16 118/78   12/15/15 132/89     He has not experienced any significant side effects from medicaiotns for hypertension.    NO active cardiac complaints or symptoms with exercise.       Amount of exercise or physical activity: 6-7 days/week for an average of 45-60 minutes    Problems taking medications regularly: No    Medication side effects: possible muscle aches    Diet: low salt and low fat/cholesterol    2.  Had one episode of tachycardia with elevated HR about 1.5 months ago. HR was 165 measures by the home BP cuff, believes it could be related to vertigo that has never went away for 2 years. NO other sx at time of elevated HR        Problem list and histories reviewed & adjusted, as indicated.  Additional history: as documented        Reviewed and updated as needed this visit by clinical staff  Tobacco  Allergies       Reviewed and updated as needed this visit by Provider           Past Medical History:  ---------------------------  Past Medical History:   Diagnosis Date     Essential hypertension, benign 1996    Hypertension, Benign     Hyperlipidemia LDL goal < 100 1998     Impotence of organic origin      Mild concentric left ventricular  hypertrophy (LVH) 3/5/15    mild concentric LVH per ECHO     Seasonal allergies     fall mainly     Simplex, oral herpes     1-2 times per year, Valtrex works well       Past Surgical History:  ---------------------------  Past Surgical History:   Procedure Laterality Date     ARTHROSCOPY KNEE RT/LT  1981, 1988, 2000    right knee     C NONSPECIFIC PROCEDURE  2006    veins stripped in both legs     C NONSPECIFIC PROCEDURE  1992    node taken off bladder     COLONOSCOPY  9/21/04    normal colonoscopy at [facility]     VASECTOMY  1978       Current Medications:  ---------------------------  Current Outpatient Prescriptions   Medication Sig Dispense Refill     sildenafil (REVATIO/VIAGRA) 20 MG tablet Take 2-3 tablets (40-60 mg) by mouth daily as needed Never use with nitroglycerin, terazosin or doxazosin. 30 tablet 5     losartan-hydrochlorothiazide (HYZAAR) 50-12.5 MG per tablet Take 1 tablet by mouth daily 90 tablet 3     potassium chloride SA (POTASSIUM CHLORIDE) 20 MEQ tablet Take 1 tablet (20 mEq) by mouth daily 90 tablet 3     montelukast (SINGULAIR) 10 MG tablet Take 1 tablet (10 mg) by mouth At Bedtime TAKE ONCE DAILY DURING THE ALLERGY SEASON(S) 90 tablet 1     albuterol (ALBUTEROL) 108 (90 BASE) MCG/ACT inhaler Inhale 2 puffs into the lungs every 4 hours as needed for shortness of breath / dyspnea or wheezing 1 Inhaler 11     pravastatin (PRAVACHOL) 40 MG tablet Take 1 tablet (40 mg) by mouth At Bedtime 90 tablet 3     triamcinolone (KENALOG) 0.1 % cream Apply sparingly once or twice per day as needed to affected area until the skin is better, then stop       valACYclovir (VALTREX) 1000 mg tablet 2 tablets twice per day for 2 doses as needed for cold sores 4 tablet 5     multivitamin, therapeutic (THERA-VIT) TABS Take 1 tablet by mouth daily       Ascorbic Acid (VITAMIN C PO) Take 500 mg by mouth daily       Saw Palmetto, Serenoa repens, (SAW PALMETTO PO) Take 1 tablet by mouth daily        aspirin-acetaminophen-caffeine (EXCEDRIN MIGRAINE) 250-250-65 MG per tablet Take 1 tablet by mouth every 6 hours as needed       desonide (DESOWEN) 0.05 % cream Apply topically 2 times daily Apply sparingly once or twice per day as needed to affected area until the skin is better, then stop 30 g 1     ASPIRIN 81 MG OR TABS 1 TABLET DAILY       ALAVERT OR as needed         Allergies:  -------------  Allergies   Allergen Reactions     Lisinopril Cough       Social History:  -------------------  Social History     Social History     Marital status:      Spouse name: N/A     Number of children: N/A     Years of education: N/A     Occupational History     C[employer]     Social History Main Topics     Smoking status: Never Smoker     Smokeless tobacco: Never Used     Alcohol use Yes      Comment: socially on weekend     Drug use: No     Sexual activity: Yes     Partners: Female     Other Topics Concern     Not on file     Social History Narrative       Family Medical History:  ------------------------------  Family History   Problem Relation Age of Onset     CANCER Father      lung cancer (smoker)     Breast Cancer Paternal Grandmother      Breast Cancer Sister      b. 1955     Family History Negative Mother      b 1928     CANCER Paternal Uncle      pancreatic cancer         ROS:  REVIEW OF SYSTEMS:    RESP: negative for cough, dyspnea, wheezing, hemoptysis  CV: negative for chest pain, palpitations, PND, MANE, orthopnea; reports no changes in their ability to perform physical activity (from cardiovascular standpoint)  GI: negative for dysphagia, N/V, pain, melena, diarrhea and constipation  NEURO: negative for numbness/tingling, paralysis, incoordination, or focal weakness     OBJECTIVE:                                                    /88  Pulse 64  Temp 98.2  F (36.8  C) (Oral)  Wt 223 lb 1.6 oz (101.2 kg)  SpO2 97%  BMI 27.16 kg/m2     GENERAL alert and no distress  EYES:   Normal sclera,conjunctiva, EOMI  HENT: oral and posterior pharynx without lesions or erythema, facies symmetric  NECK: Neck supple. No LAD, without thyroidmegaly or JVD., Carotids without bruits.  RESP: Clear to ausculation bilaterally without wheezes or crackles. Normal BS in all fields.  CV: RRR normal S1S2 without murmurs, rubs or gallops. PMI normal  LYMPH: no cervical lymph adenopathy appreciated  MS: extremities- no gross deformities of the visible extremities noted, no edema  PSYCH: Alert and oriented times 3; speech- coherent  SKIN:  No obvious significant skin lesions on visible portions of face          ASSESSMENT/PLAN:                                                      (I10) Essential hypertension, benign  (primary encounter diagnosis)  Comment: This condition is currently controlled on the current medical regimen.  Continue current therapy.   Discussed hypertension in detail including JNC VIII guidelines for blood pressure goals.  Discussed indication for treatment and treatment options.  Discussed the importance for aggressive management of HTN to prevent vascular complications later.  Recommended lower fat, lower carbohydrate, and lower sodium (<2000 mg)diet.  Discussed required intervals for follow up on HTN, lab studies, and the need to aggresive management of other cardiac disease risk factors.  Recommened pt. follow their blood pressures outside the clinic to ensure that BPs are remaining within guidelines, and to contact me if the readings are not within guidelines so we can adjust treatment as needed.   Plan: CBC with platelets and differential,         Comprehensive metabolic panel (BMP + Alb, Alk         Phos, ALT, AST, Total. Bili, TP)            (N52.9) Erectile dysfunction, unspecified erectile dysfunction type  Comment:   Plan: sildenafil (REVATIO/VIAGRA) 20 MG tablet            (E78.5) Hyperlipidemia LDL goal <130  Comment: Discussed current lipid results, previous results (if available)  "current guidelines (NCEP) for treatment and goals for lipids.  Discussed lifestyle modification, dietary changes (low fat, low simple carb) and regular aerobic exercise.  Discussed the link between dysmetabolic syndrome and impaired glucose tolerance seen in certain patterns of lipids.  Briefly discussed medication used for lipid lowering, including the statins are their possible side effects of myalgias, rhabdomyolysis, and liver toxicity.   Plan: Comprehensive metabolic panel (BMP + Alb, Alk         Phos, ALT, AST, Total. Bili, TP), Lipid panel         reflex to direct LDL            (Z11.59) Need for hepatitis C screening test  Comment:   Plan: **Hepatitis C Screen Reflex to RNA FUTURE         anytime            (Z12.5) Special screening for malignant neoplasm of prostate  Comment:   Plan: PSA, screen             *  Continue all medications at the same doses.  Contact your usual pharmacy if you need refills.     *  Return to see me in November 2017, sooner if needed.  Please get fasting labs done at the [facility] or any other [facility] lab 1-2 days before this appointment.  If you get the labs done at another [facility], make arrangements with them directly.  The orders will be in place.  Eat nothing for at least 8 hours prior to having these labs drawn.  Call [phone] to schedule appointments at [facility].       5 GOALS TO PREVENT VASCULAR DISEASE:     1.  Aggressive blood pressure control, under 130/80 ideally.  Using medications if needed.    Your blood pressure is under good control    BP Readings from Last 4 Encounters:   07/18/17 138/88   03/06/17 122/76   11/17/16 126/78   06/07/16 118/82       2.  Aggressive LDL cholesterol (\"bad cholesterol\") lowering as indicated.    Your goal is an LDL under 130 for sure, preferably under 100.  (If you have diabetes or previous vascular disease, the the LDL goals would be under 100 for sure, preferably under 70.)    New guidelines " "identify four high-risk groups who could benefit from statins:   *people with pre-existing heart disease, such as those who have had a heart attack;   *people ages 40 to 75 who have diabetes of any type  *patients ages 40 to 75 with at least a 7.5% risk of developing cardiovascular disease over the next decade, according to a formula described in the guidelines  *patients with the sort of super-high cholesterol that sometimes runs in families, as evidenced by an LDL of 190 milligrams per deciliter or higher    Your cholesterol levels are well controlled.    Recent Labs   Lab Test  11/10/16   0814  09/24/15   0817  03/04/15   0737   CHOL  185  182  168   HDL  42  42  41   LDL  121*  113  111   TRIG  111  135  78   CHOLHDLRATIO   --   4.3  4.1       3.  Aggressive diabetic prevention, screening and/or management.      You do not have diabetes as of the most recent blood tests.     4.  No smoking    5.  Consider taking low dose aspirin (81 mg) tablet once per day over the age of 50, every day unless there is a specific reason that you cannot take aspirin (such as side effect, allergy, or you are on another \"blood thinner\").        --Based on your current cardiac risk factors, you should take Aspirin 81 mg once per day if you are over 50 years of age.             See Patient Instructions    AKIL BUSTILLOS M.D., MD  [facility]     "

## 2017-07-18 NOTE — LETTER
[facility]  [address] [address]  [phone]        January 23, 2018    Ramez Fairchild  [address] [place] [id number]              Dear Ramez Fairchild    This is to remind you that your non-fasting labs is due.    You may call our office at [phone] to schedule an appointment.    Please disregard this notice if you have already had your labs drawn or made an appointment.        Sincerely,        Anand Alexander MD

## 2017-07-18 NOTE — PATIENT INSTRUCTIONS
"*  Continue all medications at the same doses.  Contact your usual pharmacy if you need refills.     *  Return to see me in November 2017, sooner if needed.  Please get fasting labs done at the [facility] or any other [facility] lab 1-2 days before this appointment.  If you get the labs done at another [facility] make arrangements with them directly.  The orders will be in place.  Eat nothing for at least 8 hours prior to having these labs drawn.  Call [phone] to schedule appointments at [facility].       5 GOALS TO PREVENT VASCULAR DISEASE:     1.  Aggressive blood pressure control, under 130/80 ideally.  Using medications if needed.    Your blood pressure is under good control    BP Readings from Last 4 Encounters:   07/18/17 138/88   03/06/17 122/76   11/17/16 126/78   06/07/16 118/82       2.  Aggressive LDL cholesterol (\"bad cholesterol\") lowering as indicated.    Your goal is an LDL under 130 for sure, preferably under 100.  (If you have diabetes or previous vascular disease, the the LDL goals would be under 100 for sure, preferably under 70.)    New guidelines identify four high-risk groups who could benefit from statins:   *people with pre-existing heart disease, such as those who have had a heart attack;   *people ages 40 to 75 who have diabetes of any type  *patients ages 40 to 75 with at least a 7.5% risk of developing cardiovascular disease over the next decade, according to a formula described in the guidelines  *patients with the sort of super-high cholesterol that sometimes runs in families, as evidenced by an LDL of 190 milligrams per deciliter or higher    Your cholesterol levels are well controlled.    Recent Labs   Lab Test  11/10/16   0814  09/24/15   0817  03/04/15   0737   CHOL  185  182  168   HDL  42  42  41   LDL  121*  113  111   TRIG  111  135  78   CHOLHDLRATIO   --   4.3  4.1       3.  Aggressive diabetic prevention, screening and/or management.  " "    You do not have diabetes as of the most recent blood tests.     4.  No smoking    5.  Consider taking low dose aspirin (81 mg) tablet once per day over the age of 50, every day unless there is a specific reason that you cannot take aspirin (such as side effect, allergy, or you are on another \"blood thinner\").        --Based on your current cardiac risk factors, you should take Aspirin 81 mg once per day if you are over 50 years of age.         "

## 2017-08-25 ENCOUNTER — OFFICE VISIT (OUTPATIENT)
Dept: INTERNAL MEDICINE | Facility: CLINIC | Age: 67
End: 2017-08-25
Payer: COMMERCIAL

## 2017-08-25 ENCOUNTER — RADIANT APPOINTMENT (OUTPATIENT)
Dept: GENERAL RADIOLOGY | Facility: CLINIC | Age: 67
End: 2017-08-25
Attending: INTERNAL MEDICINE
Payer: COMMERCIAL

## 2017-08-25 VITALS
WEIGHT: 225.9 LBS | OXYGEN SATURATION: 94 % | HEIGHT: 76 IN | HEART RATE: 83 BPM | DIASTOLIC BLOOD PRESSURE: 84 MMHG | TEMPERATURE: 98.3 F | SYSTOLIC BLOOD PRESSURE: 138 MMHG | BODY MASS INDEX: 27.51 KG/M2

## 2017-08-25 DIAGNOSIS — N50.89 SCROTUM SWELLING: ICD-10-CM

## 2017-08-25 DIAGNOSIS — J02.9 PHARYNGITIS WITH VIRAL SYNDROME: ICD-10-CM

## 2017-08-25 DIAGNOSIS — R10.9 LEFT SIDED ABDOMINAL PAIN: ICD-10-CM

## 2017-08-25 DIAGNOSIS — K57.32 DIVERTICULITIS OF COLON: ICD-10-CM

## 2017-08-25 DIAGNOSIS — B34.9 PHARYNGITIS WITH VIRAL SYNDROME: ICD-10-CM

## 2017-08-25 DIAGNOSIS — R10.9 LEFT SIDED ABDOMINAL PAIN: Primary | ICD-10-CM

## 2017-08-25 LAB
ANION GAP SERPL CALCULATED.3IONS-SCNC: 2 MMOL/L (ref 3–14)
BASOPHILS # BLD AUTO: 0 10E9/L (ref 0–0.2)
BASOPHILS NFR BLD AUTO: 0.1 %
BUN SERPL-MCNC: 12 MG/DL (ref 7–30)
CALCIUM SERPL-MCNC: 10.2 MG/DL (ref 8.5–10.1)
CHLORIDE SERPL-SCNC: 102 MMOL/L (ref 94–109)
CO2 SERPL-SCNC: 36 MMOL/L (ref 20–32)
CREAT SERPL-MCNC: 0.96 MG/DL (ref 0.66–1.25)
DEPRECATED S PYO AG THROAT QL EIA: NORMAL
DIFFERENTIAL METHOD BLD: ABNORMAL
EOSINOPHIL # BLD AUTO: 0 10E9/L (ref 0–0.7)
EOSINOPHIL NFR BLD AUTO: 0.1 %
ERYTHROCYTE [DISTWIDTH] IN BLOOD BY AUTOMATED COUNT: 13.4 % (ref 10–15)
ERYTHROCYTE [SEDIMENTATION RATE] IN BLOOD BY WESTERGREN METHOD: 18 MM/H (ref 0–20)
GFR SERPL CREATININE-BSD FRML MDRD: 79 ML/MIN/1.7M2
GLUCOSE SERPL-MCNC: 115 MG/DL (ref 70–99)
HCT VFR BLD AUTO: 45.7 % (ref 40–53)
HGB BLD-MCNC: 15.4 G/DL (ref 13.3–17.7)
LYMPHOCYTES # BLD AUTO: 1.2 10E9/L (ref 0.8–5.3)
LYMPHOCYTES NFR BLD AUTO: 8.4 %
MCH RBC QN AUTO: 31.4 PG (ref 26.5–33)
MCHC RBC AUTO-ENTMCNC: 33.7 G/DL (ref 31.5–36.5)
MCV RBC AUTO: 93 FL (ref 78–100)
MONOCYTES # BLD AUTO: 2.2 10E9/L (ref 0–1.3)
MONOCYTES NFR BLD AUTO: 15.4 %
NEUTROPHILS # BLD AUTO: 10.6 10E9/L (ref 1.6–8.3)
NEUTROPHILS NFR BLD AUTO: 76 %
PLATELET # BLD AUTO: 119 10E9/L (ref 150–450)
POTASSIUM SERPL-SCNC: 3.8 MMOL/L (ref 3.4–5.3)
RBC # BLD AUTO: 4.9 10E12/L (ref 4.4–5.9)
SODIUM SERPL-SCNC: 140 MMOL/L (ref 133–144)
SPECIMEN SOURCE: NORMAL
WBC # BLD AUTO: 14 10E9/L (ref 4–11)

## 2017-08-25 PROCEDURE — 36415 COLL VENOUS BLD VENIPUNCTURE: CPT | Performed by: INTERNAL MEDICINE

## 2017-08-25 PROCEDURE — 85025 COMPLETE CBC W/AUTO DIFF WBC: CPT | Performed by: INTERNAL MEDICINE

## 2017-08-25 PROCEDURE — 99215 OFFICE O/P EST HI 40 MIN: CPT | Performed by: INTERNAL MEDICINE

## 2017-08-25 PROCEDURE — 87081 CULTURE SCREEN ONLY: CPT | Performed by: INTERNAL MEDICINE

## 2017-08-25 PROCEDURE — 80048 BASIC METABOLIC PNL TOTAL CA: CPT | Performed by: INTERNAL MEDICINE

## 2017-08-25 PROCEDURE — 87880 STREP A ASSAY W/OPTIC: CPT | Performed by: INTERNAL MEDICINE

## 2017-08-25 PROCEDURE — 74020 XR ABDOMEN 2 VW: CPT

## 2017-08-25 PROCEDURE — 85652 RBC SED RATE AUTOMATED: CPT | Performed by: INTERNAL MEDICINE

## 2017-08-25 RX ORDER — METRONIDAZOLE 500 MG/1
500 TABLET ORAL 3 TIMES DAILY
Qty: 30 TABLET | Refills: 0 | Status: SHIPPED | OUTPATIENT
Start: 2017-08-25 | End: 2017-09-04

## 2017-08-25 RX ORDER — CIPROFLOXACIN 500 MG/1
500 TABLET, FILM COATED ORAL 2 TIMES DAILY
Qty: 20 TABLET | Refills: 0 | Status: SHIPPED | OUTPATIENT
Start: 2017-08-25 | End: 2017-09-04

## 2017-08-25 NOTE — PATIENT INSTRUCTIONS
*  Most likely diverticulitis, (infection of a diverticulum in your sigmoid colon)    *  The preferred treatment is a combination of 2 antibiotics:     --Ciprofloxin 500 mg twice per day for 10 days     --Metronidazole 500 mg three times per day for 10 days.      CIiprofloxin can cause diarrhea, tendon pain, and even rarely tendon rupture.     Metronidazole can cause metallic taste in the mouth, nausea, upset stomach.  DO NOT DRINK ANY ALCOHOL WHILE TAKING METRONIDAZOLE  As this can cause a very bad reaction with alcohol,(even a little bit).      Despite these side effects, this combination of antibitoics is the preferred treatment.      *  If you develop any worsening of your pain or significant worsening in your symptoms, then go to the hospital immediately.     *  Specifically avoid fiber intake for the next 2-3 weeks as the infection in the colon gets better.  Avoid raw vegetables, no fiber supplement, avoid oats.       *  contact me with an update regardless of how you feel next week.      *  If thw scrotal swelling does not improve, please let me know.

## 2017-08-25 NOTE — MR AVS SNAPSHOT
After Visit Summary   8/25/2017    Ramez Fairchild    MRN: [id number]           Patient Information     Date Of Birth          1950        Visit Information        Provider Department      8/25/2017 8:20 AM Anand Alexander MD [facility]        Today's Diagnoses     Left sided abdominal pain    -  1    Pharyngitis with viral syndrome        Scrotum swelling        Diverticulitis of colon          Care Instructions    *  Most likely diverticulitis, (infection of a diverticulum in your sigmoid colon)    *  The preferred treatment is a combination of 2 antibiotics:     --Ciprofloxin 500 mg twice per day for 10 days     --Metronidazole 500 mg three times per day for 10 days.      CIiprofloxin can cause diarrhea, tendon pain, and even rarely tendon rupture.     Metronidazole can cause metallic taste in the mouth, nausea, upset stomach.  DO NOT DRINK ANY ALCOHOL WHILE TAKING METRONIDAZOLE  As this can cause a very bad reaction with alcohol,(even a little bit).      Despite these side effects, this combination of antibitoics is the preferred treatment.      *  If you develop any worsening of your pain or significant worsening in your symptoms, then go to the hospital immediately.     *  Specifically avoid fiber intake for the next 2-3 weeks as the infection in the colon gets better.  Avoid raw vegetables, no fiber supplement, avoid oats.       *  contact me with an update regardless of how you feel next week.      *  If thw scrotal swelling does not improve, please let me know.               Follow-ups after your visit        Who to contact     If you have questions or need follow up information about today's clinic visit or your schedule please contact [facility] directly at [phone].  Normal or non-critical lab and imaging results will be communicated to you by MyChart, letter or phone within 4 business days after the clinic has received  "the results. If you do not hear from us within 7 days, please contact the clinic through [employer] or phone. If you have a critical or abnormal lab result, we will notify you by phone as soon as possible.  Submit refill requests through [employer] or call your pharmacy and they will forward the refill request to us. Please allow 3 business days for your refill to be completed.          Additional Information About Your Visit        [employer] Information     [employer] gives you secure access to your electronic health record. If you see a primary care provider, you can also send messages to your care team and make appointments. If you have questions, please call your primary care clinic.  If you do not have a primary care provider, please call [phone] and they will assist you.        Care EveryWhere ID     This is your Care EveryWhere ID. This could be used by other organizations to access your [facility] medical records  [id number]        Your Vitals Were     Pulse Temperature Height Pulse Oximetry BMI (Body Mass Index)       83 98.3  F (36.8  C) (Oral) 6' 4\" (1.93 m) 94% 27.5 kg/m2        Blood Pressure from Last 3 Encounters:   08/25/17 138/84   07/18/17 138/88   03/06/17 122/76    Weight from Last 3 Encounters:   08/25/17 225 lb 14.4 oz (102.5 kg)   07/18/17 223 lb 1.6 oz (101.2 kg)   03/06/17 238 lb (108 kg)              We Performed the Following     Basic metabolic panel     Beta strep group A culture     CBC with platelets and differential     ESR: Erythrocyte sedimentation rate     Strep, Rapid Screen          Today's Medication Changes          These changes are accurate as of: 8/25/17 10:22 AM.  If you have any questions, ask your nurse or doctor.               Start taking these medicines.        Dose/Directions    ciprofloxacin 500 MG tablet   Commonly known as:  CIPRO   Used for:  Left sided abdominal pain   Started by:  Anand Alexander MD        Dose:  500 mg   Take 1 tablet (500 mg) by mouth 2 " times daily for 10 days   Quantity:  20 tablet   Refills:  0       metroNIDAZOLE 500 MG tablet   Commonly known as:  FLAGYL   Used for:  Diverticulitis of colon   Started by:  Anand Alexander MD        Dose:  500 mg   Take 1 tablet (500 mg) by mouth 3 times daily for 10 days   Quantity:  30 tablet   Refills:  0            Where to get your medicines      These medications were sent to [facility] [address]  [address] [address]     Phone:  [phone]     ciprofloxacin 500 MG tablet    metroNIDAZOLE 500 MG tablet                Primary Care Provider Office Phone # Fax #    Anand Alexander [phone]       [address] [facility] [address]        Equal Access to Services     MELANY LÓPEZ : Hadii colleen payne hadasho Soomaali, waaxda luqadaha, qaybta kaalmada adeegyada, waxay idiin williams osman. So [facility] [phone].    ATENCIÓN: Si habla español, tiene a ospina disposición servicios gratuitos de asistencia lingüística. [facility] [phone].    We comply with applicable federal civil rights laws and [place] laws. We do not discriminate on the basis of race, color, national origin, age, disability sex, sexual orientation or gender identity.            Thank you!     Thank you for choosing [facility]  for your care. Our goal is always to provide you with excellent care. Hearing back from our patients is one way we can continue to improve our services. Please take a few minutes to complete the written survey that you may receive in the mail after your visit with us. Thank you!             Your Updated Medication List - Protect others around you: Learn how to safely use, store and throw away your medicines at www.disposemymeds.org.          This list is accurate as of: 8/25/17 10:22 AM.  Always use your most recent med list.                   Brand Name Dispense Instructions for use  Diagnosis    ALAVERT PO      as needed    Rhinitis, allergic seasonal       albuterol 108 (90 BASE) MCG/ACT Inhaler    PROAIR HFA    1 Inhaler    Inhale 2 puffs into the lungs every 4 hours as needed for shortness of breath / dyspnea or wheezing    Bronchospasm       aspirin 81 MG tablet      1 TABLET DAILY    Essential hypertension, benign       aspirin-acetaminophen-caffeine 250-250-65 MG per tablet    EXCEDRIN MIGRAINE     Take 1 tablet by mouth every 6 hours as needed        ciprofloxacin 500 MG tablet    CIPRO    20 tablet    Take 1 tablet (500 mg) by mouth 2 times daily for 10 days    Left sided abdominal pain       desonide 0.05 % cream    DESOWEN    30 g    Apply topically 2 times daily Apply sparingly once or twice per day as needed to affected area until the skin is better, then stop    Dermatitis       losartan-hydrochlorothiazide 50-12.5 MG per tablet    HYZAAR    90 tablet    Take 1 tablet by mouth daily    Essential hypertension, benign       metroNIDAZOLE 500 MG tablet    FLAGYL    30 tablet    Take 1 tablet (500 mg) by mouth 3 times daily for 10 days    Diverticulitis of colon       montelukast 10 MG tablet    SINGULAIR    90 tablet    Take 1 tablet (10 mg) by mouth At Bedtime TAKE ONCE DAILY DURING THE ALLERGY SEASON(S)    Seasonal allergic rhinitis, unspecified allergic rhinitis trigger, Chronic cough       multivitamin, therapeutic Tabs tablet      Take 1 tablet by mouth daily        potassium chloride SA 20 MEQ CR tablet    potassium chloride    90 tablet    Take 1 tablet (20 mEq) by mouth daily    Essential hypertension, benign       pravastatin 40 MG tablet    PRAVACHOL    90 tablet    Take 1 tablet (40 mg) by mouth At Bedtime    Hyperlipidemia LDL goal <130       SAW PALMETTO PO      Take 1 tablet by mouth daily        sildenafil 20 MG tablet    REVATIO/VIAGRA    30 tablet    Take 2-3 tablets (40-60 mg) by mouth daily as needed Never use with nitroglycerin, terazosin or doxazosin.    Erectile  dysfunction, unspecified erectile dysfunction type       triamcinolone 0.1 % cream    KENALOG     Apply sparingly once or twice per day as needed to affected area until the skin is better, then stop    Dermatitis       valACYclovir 1000 mg tablet    VALTREX    4 tablet    2 tablets twice per day for 2 doses as needed for cold sores    Oral herpes simplex infection       VITAMIN C PO      Take 500 mg by mouth daily

## 2017-08-25 NOTE — NURSING NOTE
"Chief Complaint   Patient presents with     Pharyngitis     x3 wks      Rectal Problem     mucous x2 days        Initial /84 (BP Location: Left arm, Patient Position: Chair, Cuff Size: Adult Large)  Pulse 83  Temp 98.3  F (36.8  C) (Oral)  Ht 6' 4\" (1.93 m)  Wt 225 lb 14.4 oz (102.5 kg)  SpO2 94%  BMI 27.5 kg/m2 Estimated body mass index is 27.5 kg/(m^2) as calculated from the following:    Height as of this encounter: 6' 4\" (1.93 m).    Weight as of this encounter: 225 lb 14.4 oz (102.5 kg).  Medication Reconciliation: complete       "

## 2017-08-25 NOTE — PROGRESS NOTES
SUBJECTIVE:   Ramez Fairchild is a 67 year old male who presents to clinic today for the following health issues:      Acute Illness   Acute illness concerns: Sinus/Chest congestion   Onset: x3 wks     Fever: no     Chills/Sweats: YES    Headache (location?): YES    Sinus Pressure:YES    Conjunctivitis:  YES: both    Ear Pain: no    Rhinorrhea: no     Congestion: YES- chest     Sore Throat: YES     Cough: YES - a little     Wheeze: YES- a little     Decreased Appetite: YES- a little     Nausea: no     Vomiting: no     Diarrhea:  no     Dysuria/Freq.: no     Fatigue/Achiness: YES    Sick/Strep Exposure: no      Therapies Tried and outcome: excedrin     Concern - blood in stool   Onset: x2 days     Description:   Pt states he has had some blood in stool x2 days associated with some mucous  Pain and discomfort in lower Abd (LLQ specifically)  Has been eating popcorn recently.   Has not had this before.   Reviewed last colonoscopy.     Also reports swollen left scrotum, nontedner here.   No urinary complaints.     Intensity: moderate    Progression of Symptoms:  worsening    Accompanying Signs & Symptoms:  Na     Previous history of similar problem:   Na     Precipitating factors:   Worsened by: sitting down on a chair at first     Alleviating factors:  Improved by: nothing     Therapies Tried and outcome:             Problem list and histories reviewed & adjusted, as indicated.  Additional history: as documented        Reviewed and updated as needed this visit by clinical staffTobacco  Allergies       Reviewed and updated as needed this visit by Provider           Past Medical History:  ---------------------------  Past Medical History:   Diagnosis Date     Essential hypertension, benign 1996    Hypertension, Benign     Hyperlipidemia LDL goal < 100 1998     Impotence of organic origin      Mild concentric left ventricular hypertrophy (LVH) 3/5/15    mild concentric LVH per ECHO     Seasonal allergies     fall  mainly     Simplex, oral herpes     1-2 times per year, Valtrex works well       Past Surgical History:  ---------------------------  Past Surgical History:   Procedure Laterality Date     ARTHROSCOPY KNEE RT/LT  1981, 1988, 2000    right knee     C NONSPECIFIC PROCEDURE  2006    veins stripped in both legs     C NONSPECIFIC PROCEDURE  1992    node taken off bladder     COLONOSCOPY  9/21/04    normal colonoscopy at [facility]     VASECTOMY  1978       Current Medications:  ---------------------------  Current Outpatient Prescriptions   Medication Sig Dispense Refill     ciprofloxacin (CIPRO) 500 MG tablet Take 1 tablet (500 mg) by mouth 2 times daily for 10 days 20 tablet 0     metroNIDAZOLE (FLAGYL) 500 MG tablet Take 1 tablet (500 mg) by mouth 3 times daily for 10 days 30 tablet 0     sildenafil (REVATIO/VIAGRA) 20 MG tablet Take 2-3 tablets (40-60 mg) by mouth daily as needed Never use with nitroglycerin, terazosin or doxazosin. 30 tablet 5     losartan-hydrochlorothiazide (HYZAAR) 50-12.5 MG per tablet Take 1 tablet by mouth daily 90 tablet 3     potassium chloride SA (POTASSIUM CHLORIDE) 20 MEQ tablet Take 1 tablet (20 mEq) by mouth daily 90 tablet 3     montelukast (SINGULAIR) 10 MG tablet Take 1 tablet (10 mg) by mouth At Bedtime TAKE ONCE DAILY DURING THE ALLERGY SEASON(S) 90 tablet 1     albuterol (ALBUTEROL) 108 (90 BASE) MCG/ACT inhaler Inhale 2 puffs into the lungs every 4 hours as needed for shortness of breath / dyspnea or wheezing 1 Inhaler 11     pravastatin (PRAVACHOL) 40 MG tablet Take 1 tablet (40 mg) by mouth At Bedtime 90 tablet 3     triamcinolone (KENALOG) 0.1 % cream Apply sparingly once or twice per day as needed to affected area until the skin is better, then stop       valACYclovir (VALTREX) 1000 mg tablet 2 tablets twice per day for 2 doses as needed for cold sores 4 tablet 5     multivitamin, therapeutic (THERA-VIT) TABS Take 1 tablet by mouth daily       Ascorbic Acid (VITAMIN C PO) Take 500  mg by mouth daily       Saw Palmetto, Serenoa repens, (SAW PALMETTO PO) Take 1 tablet by mouth daily       aspirin-acetaminophen-caffeine (EXCEDRIN MIGRAINE) 250-250-65 MG per tablet Take 1 tablet by mouth every 6 hours as needed       desonide (DESOWEN) 0.05 % cream Apply topically 2 times daily Apply sparingly once or twice per day as needed to affected area until the skin is better, then stop 30 g 1     ASPIRIN 81 MG OR TABS 1 TABLET DAILY       ALAVERT OR as needed         Allergies:  -------------  Allergies   Allergen Reactions     Lisinopril Cough       Social History:  -------------------  Social History     Social History     Marital status:      Spouse name: N/A     Number of children: N/A     Years of education: N/A     Occupational History     [employer]     Social History Main Topics     Smoking status: Never Smoker     Smokeless tobacco: Never Used     Alcohol use Yes      Comment: socially on weekend     Drug use: No     Sexual activity: Yes     Partners: Female     Other Topics Concern     Not on file     Social History Narrative       Family Medical History:  ------------------------------  Family History   Problem Relation Age of Onset     CANCER Father      lung cancer (smoker)     Breast Cancer Paternal Grandmother      Breast Cancer Sister      b. 1955     Family History Negative Mother      b 1928     CANCER Paternal Uncle      pancreatic cancer         ROS:  REVIEW OF SYSTEMS:    RESP: negative for cough, dyspnea, wheezing, hemoptysis  CV: negative for chest pain, palpitations, PND, MANE, orthopnea; reports no changes in their ability to perform physical activity (from cardiovascular standpoint)  GI: negative for dysphagia, N/V, melena, diarrhea and constipation  NEURO: negative for numbness/tingling, paralysis, incoordination, or focal weakness     OBJECTIVE:                                                    /84 (BP Location: Left arm, Patient Position:  "Chair, Cuff Size: Adult Large)  Pulse 83  Temp 98.3  F (36.8  C) (Oral)  Ht 6' 4\" (1.93 m)  Wt 225 lb 14.4 oz (102.5 kg)  SpO2 94%  BMI 27.5 kg/m2     GENERAL alert and no distress.  EYES conjunctivae/corneas clear. PERRL, EOM's intact  HENT: NCAT,oral and posterior pharynx without lesions or erythema, facies symmetric  NECK: Neck supple. No LAD, without thyroidmegaly or JVD.  RESP: Clear to ausculation bilaterally without wheezes or crackles. Normal BS in all fields.  CV: RRR normal S1S2 without  murmurs, rubs or gallops. PMI normal  LYMPH: no cervical lymph adenopathy appreciated  GI: mild tendenress with deep palpation of LLQ abdomen, no organomegaly, normal BS in all quadrants, without rebound or guarding  MS: No cyanosis, clubbing or edema noted bilaterally in Upper and/or Lower Extremities  SKIN: no significant ulcers, lesions or rashes on the visualized portions of the skin  NEURO: Alert and Oriented x 3, Gait normal. Reflexes normal and symmetric. Sensation grossly WNL..  PSYCH: Alert and oriented times 3; speech- coherent , normal rate and volume; able to articulate logical thoughts, able to abstract reason, no tangential thoughts, no hallucinations or delusions, affect- normal   , swollen left scrotum, nontender, no clear masses felt (i.e. Did not feel hydrocele, testicles flet normal in size, no Prenz sign, no bowel sounds in scrotum, no palpable inguinal hernia      Component      Latest Ref Rng & Units 8/25/2017   WBC      4.0 - 11.0 10e9/L 14.0 (H)   RBC Count      4.4 - 5.9 10e12/L 4.90   Hemoglobin      13.3 - 17.7 g/dL 15.4   Hematocrit      40.0 - 53.0 % 45.7   MCV      78 - 100 fl 93   MCH      26.5 - 33.0 pg 31.4   MCHC      31.5 - 36.5 g/dL 33.7   RDW      10.0 - 15.0 % 13.4   Platelet Count      150 - 450 10e9/L 119 (L)   Diff Method       Automated Method   % Neutrophils      % 76.0   % Lymphocytes      % 8.4   % Monocytes      % 15.4   % Eosinophils      % 0.1   % Basophils      % 0.1 "   Absolute Neutrophil      1.6 - 8.3 10e9/L 10.6 (H)   Absolute Lymphocytes      0.8 - 5.3 10e9/L 1.2   Absolute Monocytes      0.0 - 1.3 10e9/L 2.2 (H)   Absolute Eosinophils      0.0 - 0.7 10e9/L 0.0   Absolute Basophils      0.0 - 0.2 10e9/L 0.0   Sodium      133 - 144 mmol/L 140   Potassium      3.4 - 5.3 mmol/L 3.8   Chloride      94 - 109 mmol/L 102   Carbon Dioxide      20 - 32 mmol/L 36 (H)   Anion Gap      3 - 14 mmol/L 2 (L)   Glucose      70 - 99 mg/dL 115 (H)   Urea Nitrogen      7 - 30 mg/dL 12   Creatinine      0.66 - 1.25 mg/dL 0.96   GFR Estimate      >60 mL/min/1.7m2 79   GFR Estimate If Black      >60 mL/min/1.7m2 >90   Calcium      8.5 - 10.1 mg/dL 10.2 (H)   Specimen Description       Throat   Rapid Strep A Screen       NEGATIVE: No Group A streptococcal antigen detected by immunoassay, await culture report.   Sed Rate      0 - 20 mm/h 18          ASSESSMENT/PLAN:                                                      (R10.9) Left sided abdominal pain  (primary encounter diagnosis)  Comment: The symptoms and exam appear to be most consistent with diverticulitis.    Review the pathophysiology and anatomical/mechanical issues of diverticulosis and diverticulitis.  At this time, an Abd CT scan is not indicated.  Will treat with combination of cipro/flagyl for 10 days.    Discussed cipro and flagyl as the treatment of choice.   Discussed side effects of ciprofloxin including but not limited to diarrhea, achilles tenodon rupture, and GI upset.  Discussed the side effects of metronidazole (flagyl) including nausea/vomitting, metallic taste, altered taste sensation, and risk of disulfram reaction associated with any alcohol use and therefore instructed the patient to avoid any and all alcohol while on this medication.  They were instructed to call if any side effects occur.   Told to go to the ER immediately if the symptoms worsen or change significantly in any way.      Plan: Basic metabolic panel, XR  Abdomen 2 Views, CBC         with platelets and differential, ESR:         Erythrocyte sedimentation rate, ciprofloxacin         (CIPRO) 500 MG tablet, CANCELED: CBC with         platelets and differential            (J02.9,  B34.9) Pharyngitis with viral syndrome  Comment:   Plan: Strep, Rapid Screen, Beta strep group A culture            (N50.89) Scrotum swelling  Comment: unclear reason for this. Would not expect this with plain diverticulitis.   Could be reactive phenomenon  Could be be viral orchitis.   Plan:     (K57.32) Diverticulitis of colon  Comment:   Plan: metroNIDAZOLE (FLAGYL) 500 MG tablet            *  Most likely diverticulitis, (infection of a diverticulum in your sigmoid colon)    *  The preferred treatment is a combination of 2 antibiotics:     --Ciprofloxin 500 mg twice per day for 10 days     --Metronidazole 500 mg three times per day for 10 days.      CIiprofloxin can cause diarrhea, tendon pain, and even rarely tendon rupture.     Metronidazole can cause metallic taste in the mouth, nausea, upset stomach.  DO NOT DRINK ANY ALCOHOL WHILE TAKING METRONIDAZOLE  As this can cause a very bad reaction with alcohol,(even a little bit).      Despite these side effects, this combination of antibitoics is the preferred treatment.      *  If you develop any worsening of your pain or significant worsening in your symptoms, then go to the hospital immediately.     *  Specifically avoid fiber intake for the next 2-3 weeks as the infection in the colon gets better.  Avoid raw vegetables, no fiber supplement, avoid oats.       *  contact me with an update regardless of how you feel next week.      *  If thw scrotal swelling does not improve, please let me know.           See Patient Instructions    AKIL BUSTILLOS M.D., MD  [facility]

## 2017-08-26 LAB
BACTERIA SPEC CULT: NORMAL
SPECIMEN SOURCE: NORMAL

## 2017-09-05 ENCOUNTER — MYC MEDICAL ADVICE (OUTPATIENT)
Dept: INTERNAL MEDICINE | Facility: CLINIC | Age: 67
End: 2017-09-05

## 2017-09-05 DIAGNOSIS — R10.32 ABDOMINAL PAIN, LEFT LOWER QUADRANT: Primary | ICD-10-CM

## 2017-09-05 NOTE — TELEPHONE ENCOUNTER
Needs Abd/pelvic CT scan.   Placed order at [facility]   Will need to swing by IM  to get oral contrast.

## 2017-09-06 ENCOUNTER — MYC MEDICAL ADVICE (OUTPATIENT)
Dept: INTERNAL MEDICINE | Facility: CLINIC | Age: 67
End: 2017-09-06

## 2017-09-07 ENCOUNTER — OFFICE VISIT (OUTPATIENT)
Dept: INTERNAL MEDICINE | Facility: CLINIC | Age: 67
End: 2017-09-07
Payer: COMMERCIAL

## 2017-09-07 ENCOUNTER — HOSPITAL ENCOUNTER (OUTPATIENT)
Dept: CT IMAGING | Facility: CLINIC | Age: 67
Discharge: HOME | End: 2017-09-07
Attending: INTERNAL MEDICINE | Admitting: INTERNAL MEDICINE
Payer: COMMERCIAL

## 2017-09-07 VITALS
SYSTOLIC BLOOD PRESSURE: 122 MMHG | HEART RATE: 72 BPM | TEMPERATURE: 98.7 F | DIASTOLIC BLOOD PRESSURE: 82 MMHG | OXYGEN SATURATION: 94 % | WEIGHT: 221.5 LBS | BODY MASS INDEX: 26.96 KG/M2

## 2017-09-07 DIAGNOSIS — R10.32 ABDOMINAL PAIN, LEFT LOWER QUADRANT: ICD-10-CM

## 2017-09-07 DIAGNOSIS — K57.32 DIVERTICULITIS OF COLON: Primary | ICD-10-CM

## 2017-09-07 PROCEDURE — 74177 CT ABD & PELVIS W/CONTRAST: CPT

## 2017-09-07 PROCEDURE — 25000128 H RX IP 250 OP 636: Performed by: INTERNAL MEDICINE

## 2017-09-07 PROCEDURE — 99214 OFFICE O/P EST MOD 30 MIN: CPT | Performed by: INTERNAL MEDICINE

## 2017-09-07 PROCEDURE — 25000125 ZZHC RX 250: Performed by: INTERNAL MEDICINE

## 2017-09-07 RX ORDER — IOPAMIDOL 755 MG/ML
110 INJECTION, SOLUTION INTRAVASCULAR ONCE
Status: COMPLETED | OUTPATIENT
Start: 2017-09-07 | End: 2017-09-07

## 2017-09-07 RX ADMIN — SODIUM CHLORIDE, PRESERVATIVE FREE 72 ML: 5 INJECTION INTRAVENOUS at 08:38

## 2017-09-07 RX ADMIN — IOPAMIDOL 110 ML: 755 INJECTION, SOLUTION INTRAVENOUS at 08:38

## 2017-09-07 NOTE — TELEPHONE ENCOUNTER
Please call the patient.   I can see him at my hold spot at [phone] (or else add on at [phone]) this afternoon since I am out of the clinic tomorrow.   We can review the Ct scan from this morning and reexamine him since he still has symptoms.

## 2017-09-07 NOTE — NURSING NOTE
"Chief Complaint   Patient presents with     Results     review ABD CT scan results. swelling in scrotum area and pain in LLQ X 2.5 weeks       Initial /82  Pulse 72  Temp 98.7  F (37.1  C) (Oral)  Wt 221 lb 8 oz (100.5 kg)  SpO2 94%  BMI 26.96 kg/m2 Estimated body mass index is 26.96 kg/(m^2) as calculated from the following:    Height as of 8/25/17: 6' 4\" (1.93 m).    Weight as of this encounter: 221 lb 8 oz (100.5 kg).  Medication Reconciliation: complete   Irma Blevins CMA      "

## 2017-09-07 NOTE — PROGRESS NOTES
SUBJECTIVE:   Ramez Fairchild is a 67 year old male who presents to clinic today for the following health issues:      ABD pain      Duration: 2.5 weeks    Description (location/character/radiation): intermittent sharp pain in LLQ     Intensity:  moderate, severe    Accompanying signs and symptoms: swelling in scrotum    History (similar episodes/previous evaluation): father and uncles al;l had prostate problems/surgery    Precipitating or alleviating factors: worse when laying on stomach    Therapies tried and outcome: none, here to review CT scan       Reviewed CT result with wife and patient:    CT ABDOMEN AND PELVIS WITH CONTRAST  9/7/2017 8:52 AM      HISTORY: Left lower abdominal pain and scrotal swelling; recently  treated for presumed diverticulitis, but not improving. Left lower  quadrant pain.     COMPARISON: 12/18/2015     TECHNIQUE: Volumetric helical acquisition of CT images from the lung  bases through the symphysis pubis were acquired after administration  of 110 mL Isovue-370 intravenous contrast. Coronal images  reconstructed from axial image data. Radiation dose for this scan was  reduced using automated exposure control, adjustment of the mA and/or  kV according to patient size, or iterative reconstruction technique.     FINDINGS: There is mild inflammation around the sigmoid colon  consistent with acute diverticulitis. No evidence for intraperitoneal  free air or abscess formation. Extensive diverticulosis. Unremarkable  appendix. Simple cyst in the inferior pole of the left kidney.  Prominent but simple appearing cyst in the right lobe of the liver. A  few low-dense lesions are seen in the liver which are too small to  definitively characterize and therefore indeterminate, but based on  prevalence in a patient without a known malignancy are most likely  small cysts. Tiny hiatal hernia. Small gastric diverticulum at the  fundus. There are moderate atherosclerotic changes of the  visualized  aorta and its branches. There is no evidence of aortic dissection or  aneurysm. Spleen, adrenal glands, kidneys and pancreas demonstrate no  worrisome focal lesion. Lung bases are clear. No pleural or  pericardial effusion. Bone windows reveal no suspicious lesions. No  free air in the abdomen. There are no abdominal or pelvic lymph nodes  that are abnormal by size criteria. There are no dilated loops of  small intestine or large bowel to suggest ileus or obstruction.         IMPRESSION: Acute sigmoid diverticulitis. No evidence for  intraperitoneal free air or abscess formation.     VIVIANE LEPE MD      Problem list and histories reviewed & adjusted, as indicated.  Additional history: as documented        Reviewed and updated as needed this visit by clinical staffTobacco       Reviewed and updated as needed this visit by Provider           Past Medical History:  ---------------------------  Past Medical History:   Diagnosis Date     Essential hypertension, benign 1996    Hypertension, Benign     Hyperlipidemia LDL goal < 100 1998     Impotence of organic origin      Mild concentric left ventricular hypertrophy (LVH) 3/5/15    mild concentric LVH per ECHO     Seasonal allergies     fall mainly     Simplex, oral herpes     1-2 times per year, Valtrex works well       Past Surgical History:  ---------------------------  Past Surgical History:   Procedure Laterality Date     ARTHROSCOPY KNEE RT/LT  1981, 1988, 2000    right knee     C NONSPECIFIC PROCEDURE  2006    veins stripped in both legs     C NONSPECIFIC PROCEDURE  1992    node taken off bladder     COLONOSCOPY  9/21/04    normal colonoscopy at [facility]     VASECTOMY  1978       Current Medications:  ---------------------------  Current Outpatient Prescriptions   Medication Sig Dispense Refill     sildenafil (REVATIO/VIAGRA) 20 MG tablet Take 2-3 tablets (40-60 mg) by mouth daily as needed Never use with nitroglycerin, terazosin or doxazosin. 30 tablet  5     losartan-hydrochlorothiazide (HYZAAR) 50-12.5 MG per tablet Take 1 tablet by mouth daily 90 tablet 3     potassium chloride SA (POTASSIUM CHLORIDE) 20 MEQ tablet Take 1 tablet (20 mEq) by mouth daily 90 tablet 3     montelukast (SINGULAIR) 10 MG tablet Take 1 tablet (10 mg) by mouth At Bedtime TAKE ONCE DAILY DURING THE ALLERGY SEASON(S) 90 tablet 1     albuterol (ALBUTEROL) 108 (90 BASE) MCG/ACT inhaler Inhale 2 puffs into the lungs every 4 hours as needed for shortness of breath / dyspnea or wheezing 1 Inhaler 11     pravastatin (PRAVACHOL) 40 MG tablet Take 1 tablet (40 mg) by mouth At Bedtime 90 tablet 3     triamcinolone (KENALOG) 0.1 % cream Apply sparingly once or twice per day as needed to affected area until the skin is better, then stop       valACYclovir (VALTREX) 1000 mg tablet 2 tablets twice per day for 2 doses as needed for cold sores 4 tablet 5     multivitamin, therapeutic (THERA-VIT) TABS Take 1 tablet by mouth daily       Ascorbic Acid (VITAMIN C PO) Take 500 mg by mouth daily       Saw Palmetto, Serenoa repens, (SAW PALMETTO PO) Take 1 tablet by mouth daily       aspirin-acetaminophen-caffeine (EXCEDRIN MIGRAINE) 250-250-65 MG per tablet Take 1 tablet by mouth every 6 hours as needed       desonide (DESOWEN) 0.05 % cream Apply topically 2 times daily Apply sparingly once or twice per day as needed to affected area until the skin is better, then stop 30 g 1     ASPIRIN 81 MG OR TABS 1 TABLET DAILY       ALAVERT OR as needed         Allergies:  -------------  Allergies   Allergen Reactions     Lisinopril Cough       Social History:  -------------------  Social History     Social History     Marital status:      Spouse name: N/A     Number of children: N/A     Years of education: N/A     Occupational History     [employer]     Social History Main Topics     Smoking status: Never Smoker     Smokeless tobacco: Never Used     Alcohol use Yes      Comment: socially on  weekend     Drug use: No     Sexual activity: Yes     Partners: Female     Other Topics Concern     Not on file     Social History Narrative       Family Medical History:  ------------------------------  Family History   Problem Relation Age of Onset     CANCER Father      lung cancer (smoker)     Breast Cancer Paternal Grandmother      Breast Cancer Sister      b. 1955     Family History Negative Mother      b 1928     CANCER Paternal Uncle      pancreatic cancer         ROS:  REVIEW OF SYSTEMS:    RESP: negative for cough, dyspnea, wheezing, hemoptysis  CV: negative for chest pain, palpitations, PND, MANE, orthopnea; reports no changes in their ability to perform physical activity (from cardiovascular standpoint)  GI: negative for dysphagia, N/V, pain, melena, diarrhea and constipation  NEURO: negative for numbness/tingling, paralysis, incoordination, or focal weakness       OBJECTIVE:                                                    /82  Pulse 72  Temp 98.7  F (37.1  C) (Oral)  Wt 221 lb 8 oz (100.5 kg)  SpO2 94%  BMI 26.96 kg/m2     GENERAL alert and no distress.  EYES conjunctivae/corneas clear. PERRL, EOM's intact  HENT: NCAT,oral and posterior pharynx without lesions or erythema, facies symmetric  NECK: Neck supple. No LAD, without thyroidmegaly or JVD.  RESP: Clear to ausculation bilaterally without wheezes or crackles. Normal BS in all fields.  CV: RRR normal S1S2 without  murmurs, rubs or gallops. PMI normal  LYMPH: no cervical lymph adenopathy appreciated  GI: mild tenderness in the left lower abdomen, no organomegaly, normal BS in all quadrants, without rebound or guarding  MS: No cyanosis, clubbing or edema noted bilaterally in Upper and/or Lower Extremities  SKIN: no significant ulcers, lesions or rashes on the visualized portions of the skin  NEURO: Alert and Oriented x 3, Gait normal. Reflexes normal and symmetric. Sensation grossly WNL..  PSYCH: Alert and oriented times 3; speech-  coherent , normal rate and volume; able to articulate logical thoughts, able to abstract reason, no tangential thoughts, no hallucinations or delusions, affect- normal          ASSESSMENT/PLAN:                                                      (K57.32) Diverticulitis of colon  (primary encounter diagnosis)  Comment: The symptoms and exam appear to be most consistent with diverticulitis.    Review the pathophysiology and anatomical/mechanical issues of diverticulosis and diverticulitis.  Antibiotics will be initiated.   Will treat with combination of cipro/flagyl for 10 days.    Discussed cipro and flagyl as the treatment of choice.   Discussed side effects of ciprofloxin including but not limited to diarrhea, achilles tenodon rupture, and GI upset.  Discussed the side effects of metronidazole (flagyl) including nausea/vomitting, metallic taste, altered taste sensation, and risk of disulfram reaction associated with any alcohol use and therefore instructed the patient to avoid any and all alcohol while on this medication.  They were instructed to call if any side effects occur.   Told to go to the ER immediately if the symptoms worsen or change significantly in any way.     Low residue diet for 2-3 weeks, then go towards higher fiber diet to prevent future recurrences.   Told to avoid smaller harder foods (e.g. nuts, popcorn, seeds) in the future.  Patient was told to contact us with update in a few days to confirm they are getting better.   If they are not noticably better, then they need a CT scan.      Plan:        See Patient Instructions    AKIL BUSTILLOS M.D., MD  [facility]

## 2017-09-07 NOTE — MR AVS SNAPSHOT
"              After Visit Summary   9/7/2017    Ramez Fairchild    MRN: [id number]           Patient Information     Date Of Birth          1950        Visit Information        Provider Department      9/7/2017 2:20 PM Anand Alexander MD [facility]        Care Instructions    *  Resolving diverticulitis.     *  No further antibiotics indicated    *  Follow a low residue diet for the next 2-3 weeks.  Avoid high fibber foods such as raw vegetables, fiber supplements.     *  Consider a \"pro biotic\" tablet to help replace good bacteria that may have been taken out by your course of antibiotics.  Examples at \"Align\" or any other product from Whole foods or [employer].     *  Let me know if you develop any serious diarrhea in the next several weeks, this could be an indiaction of a serious infection related to powerful antibitoics.     *  Return to see me in 1 month, sooner if needed.  Call [phone] to schedule this appointment.             Follow-ups after your visit        Who to contact     If you have questions or need follow up information about today's clinic visit or your schedule please contact [facility] directly at [phone].  Normal or non-critical lab and imaging results will be communicated to you by LifePicshart, letter or phone within 4 business days after the clinic has received the results. If you do not hear from us within 7 days, please contact the clinic through LifePicshart or phone. If you have a critical or abnormal lab result, we will notify you by phone as soon as possible.  Submit refill requests through [employer] or call your pharmacy and they will forward the refill request to us. Please allow 3 business days for your refill to be completed.          Additional Information About Your Visit        MyChart Information     [employer] gives you secure access to your electronic health record. If you see a primary care provider, you can also send " messages to your care team and make appointments. If you have questions, please call your primary care clinic.  If you do not have a primary care provider, please call [phone] and they will assist you.        Care EveryWhere ID     This is your Care EveryWhere ID. This could be used by other organizations to access your [facility] medical records  [id number]        Your Vitals Were     Pulse Temperature Pulse Oximetry BMI (Body Mass Index)          72 98.7  F (37.1  C) (Oral) 94% 26.96 kg/m2         Blood Pressure from Last 3 Encounters:   09/07/17 122/82   08/25/17 138/84   07/18/17 138/88    Weight from Last 3 Encounters:   09/07/17 221 lb 8 oz (100.5 kg)   08/25/17 225 lb 14.4 oz (102.5 kg)   07/18/17 223 lb 1.6 oz (101.2 kg)              Today, you had the following     No orders found for display       Primary Care Provider Office Phone # Fax #    Anand Alexander [phone]       [address] [place] [address]        Equal Access to Services     MELANY LÓPEZ : Hadii aad ku hadasho Soomaali, waaxda luqadaha, qaybta kaalmada adeegyada, ceci cabrera . So [facility] [phone].    ATENCIÓN: Si habla español, tiene a ospina disposición servicios gratuitos de asistencia lingüística. [facility] [phone].    We comply with applicable federal civil rights laws and [place] laws. We do not discriminate on the basis of race, color, national origin, age, disability sex, sexual orientation or gender identity.            Thank you!     Thank you for choosing [facility]  for your care. Our goal is always to provide you with excellent care. Hearing back from our patients is one way we can continue to improve our services. Please take a few minutes to complete the written survey that you may receive in the mail after your visit with us. Thank you!             Your Updated Medication List - Protect others around you: Learn how to safely use,  store and throw away your medicines at www.disposemymeds.org.          This list is accurate as of: 9/7/17  3:19 PM.  Always use your most recent med list.                   Brand Name Dispense Instructions for use Diagnosis    ALAVERT PO      as needed    Rhinitis, allergic seasonal       albuterol 108 (90 BASE) MCG/ACT Inhaler    PROAIR HFA    1 Inhaler    Inhale 2 puffs into the lungs every 4 hours as needed for shortness of breath / dyspnea or wheezing    Bronchospasm       aspirin 81 MG tablet      1 TABLET DAILY    Essential hypertension, benign       aspirin-acetaminophen-caffeine 250-250-65 MG per tablet    EXCEDRIN MIGRAINE     Take 1 tablet by mouth every 6 hours as needed        desonide 0.05 % cream    DESOWEN    30 g    Apply topically 2 times daily Apply sparingly once or twice per day as needed to affected area until the skin is better, then stop    Dermatitis       losartan-hydrochlorothiazide 50-12.5 MG per tablet    HYZAAR    90 tablet    Take 1 tablet by mouth daily    Essential hypertension, benign       montelukast 10 MG tablet    SINGULAIR    90 tablet    Take 1 tablet (10 mg) by mouth At Bedtime TAKE ONCE DAILY DURING THE ALLERGY SEASON(S)    Seasonal allergic rhinitis, unspecified allergic rhinitis trigger, Chronic cough       multivitamin, therapeutic Tabs tablet      Take 1 tablet by mouth daily        potassium chloride SA 20 MEQ CR tablet    potassium chloride    90 tablet    Take 1 tablet (20 mEq) by mouth daily    Essential hypertension, benign       pravastatin 40 MG tablet    PRAVACHOL    90 tablet    Take 1 tablet (40 mg) by mouth At Bedtime    Hyperlipidemia LDL goal <130       SAW PALMETTO PO      Take 1 tablet by mouth daily        sildenafil 20 MG tablet    REVATIO/VIAGRA    30 tablet    Take 2-3 tablets (40-60 mg) by mouth daily as needed Never use with nitroglycerin, terazosin or doxazosin.    Erectile dysfunction, unspecified erectile dysfunction type       triamcinolone 0.1 %  cream    KENALOG     Apply sparingly once or twice per day as needed to affected area until the skin is better, then stop    Dermatitis       valACYclovir 1000 mg tablet    VALTREX    4 tablet    2 tablets twice per day for 2 doses as needed for cold sores    Oral herpes simplex infection       VITAMIN C PO      Take 500 mg by mouth daily

## 2017-09-07 NOTE — PATIENT INSTRUCTIONS
"*  Resolving diverticulitis.     *  No further antibiotics indicated    *  Follow a low residue diet for the next 2-3 weeks.  Avoid high fibber foods such as raw vegetables, fiber supplements.     *  Consider a \"pro biotic\" tablet to help replace good bacteria that may have been taken out by your course of antibiotics.  Examples at \"Align\" or any other product from Whole foods or [employer].     *  Let me know if you develop any serious diarrhea in the next several weeks, this could be an indiaction of a serious infection related to powerful antibitoics.     *  Return to see me in 1 month, sooner if needed.  Call [phone] to schedule this appointment.     "

## 2017-10-03 DIAGNOSIS — E78.5 HYPERLIPIDEMIA LDL GOAL <130: ICD-10-CM

## 2017-10-03 DIAGNOSIS — I10 ESSENTIAL HYPERTENSION, BENIGN: ICD-10-CM

## 2017-10-03 DIAGNOSIS — Z11.59 NEED FOR HEPATITIS C SCREENING TEST: ICD-10-CM

## 2017-10-03 LAB
ALBUMIN SERPL-MCNC: 3.6 G/DL (ref 3.4–5)
ALP SERPL-CCNC: 51 U/L (ref 40–150)
ALT SERPL W P-5'-P-CCNC: 49 U/L (ref 0–70)
ANION GAP SERPL CALCULATED.3IONS-SCNC: 4 MMOL/L (ref 3–14)
AST SERPL W P-5'-P-CCNC: 46 U/L (ref 0–45)
BASOPHILS # BLD AUTO: 0 10E9/L (ref 0–0.2)
BASOPHILS NFR BLD AUTO: 0.5 %
BILIRUB SERPL-MCNC: 1.2 MG/DL (ref 0.2–1.3)
BUN SERPL-MCNC: 14 MG/DL (ref 7–30)
CALCIUM SERPL-MCNC: 10.2 MG/DL (ref 8.5–10.1)
CHLORIDE SERPL-SCNC: 106 MMOL/L (ref 94–109)
CHOLEST SERPL-MCNC: 194 MG/DL
CO2 SERPL-SCNC: 33 MMOL/L (ref 20–32)
CREAT SERPL-MCNC: 0.98 MG/DL (ref 0.66–1.25)
DIFFERENTIAL METHOD BLD: ABNORMAL
EOSINOPHIL # BLD AUTO: 0.1 10E9/L (ref 0–0.7)
EOSINOPHIL NFR BLD AUTO: 1.1 %
ERYTHROCYTE [DISTWIDTH] IN BLOOD BY AUTOMATED COUNT: 13.7 % (ref 10–15)
GFR SERPL CREATININE-BSD FRML MDRD: 77 ML/MIN/1.7M2
GLUCOSE SERPL-MCNC: 95 MG/DL (ref 70–99)
HCT VFR BLD AUTO: 47.1 % (ref 40–53)
HCV AB SERPL QL IA: NONREACTIVE
HDLC SERPL-MCNC: 54 MG/DL
HGB BLD-MCNC: 15.6 G/DL (ref 13.3–17.7)
LDLC SERPL CALC-MCNC: 119 MG/DL
LYMPHOCYTES # BLD AUTO: 1.4 10E9/L (ref 0.8–5.3)
LYMPHOCYTES NFR BLD AUTO: 22.6 %
MCH RBC QN AUTO: 30.9 PG (ref 26.5–33)
MCHC RBC AUTO-ENTMCNC: 33.1 G/DL (ref 31.5–36.5)
MCV RBC AUTO: 93 FL (ref 78–100)
MONOCYTES # BLD AUTO: 0.7 10E9/L (ref 0–1.3)
MONOCYTES NFR BLD AUTO: 11.4 %
NEUTROPHILS # BLD AUTO: 4.1 10E9/L (ref 1.6–8.3)
NEUTROPHILS NFR BLD AUTO: 64.4 %
NONHDLC SERPL-MCNC: 140 MG/DL
PLATELET # BLD AUTO: 87 10E9/L (ref 150–450)
POTASSIUM SERPL-SCNC: 4.2 MMOL/L (ref 3.4–5.3)
PROT SERPL-MCNC: 7.3 G/DL (ref 6.8–8.8)
RBC # BLD AUTO: 5.05 10E12/L (ref 4.4–5.9)
SODIUM SERPL-SCNC: 143 MMOL/L (ref 133–144)
TRIGL SERPL-MCNC: 107 MG/DL
WBC # BLD AUTO: 6.3 10E9/L (ref 4–11)

## 2017-10-03 PROCEDURE — 85025 COMPLETE CBC W/AUTO DIFF WBC: CPT | Performed by: INTERNAL MEDICINE

## 2017-10-03 PROCEDURE — 80061 LIPID PANEL: CPT | Performed by: INTERNAL MEDICINE

## 2017-10-03 PROCEDURE — 80053 COMPREHEN METABOLIC PANEL: CPT | Performed by: INTERNAL MEDICINE

## 2017-10-03 PROCEDURE — 86803 HEPATITIS C AB TEST: CPT | Performed by: INTERNAL MEDICINE

## 2017-10-03 PROCEDURE — 36415 COLL VENOUS BLD VENIPUNCTURE: CPT | Performed by: INTERNAL MEDICINE

## 2017-10-10 ENCOUNTER — OFFICE VISIT (OUTPATIENT)
Dept: INTERNAL MEDICINE | Facility: CLINIC | Age: 67
End: 2017-10-10
Payer: COMMERCIAL

## 2017-10-10 VITALS
WEIGHT: 213.6 LBS | OXYGEN SATURATION: 98 % | HEART RATE: 66 BPM | BODY MASS INDEX: 26.01 KG/M2 | TEMPERATURE: 98.6 F | HEIGHT: 76 IN | SYSTOLIC BLOOD PRESSURE: 130 MMHG | DIASTOLIC BLOOD PRESSURE: 82 MMHG

## 2017-10-10 DIAGNOSIS — J98.01 BRONCHOSPASM: ICD-10-CM

## 2017-10-10 DIAGNOSIS — R05.3 CHRONIC COUGH: ICD-10-CM

## 2017-10-10 DIAGNOSIS — Z23 NEED FOR PROPHYLACTIC VACCINATION AND INOCULATION AGAINST INFLUENZA: ICD-10-CM

## 2017-10-10 DIAGNOSIS — I10 ESSENTIAL HYPERTENSION, BENIGN: ICD-10-CM

## 2017-10-10 DIAGNOSIS — E78.5 HYPERLIPIDEMIA LDL GOAL <130: ICD-10-CM

## 2017-10-10 DIAGNOSIS — Z00.00 MEDICARE ANNUAL WELLNESS VISIT, INITIAL: Primary | ICD-10-CM

## 2017-10-10 PROCEDURE — G0402 INITIAL PREVENTIVE EXAM: HCPCS | Performed by: INTERNAL MEDICINE

## 2017-10-10 PROCEDURE — G0008 ADMIN INFLUENZA VIRUS VAC: HCPCS | Performed by: INTERNAL MEDICINE

## 2017-10-10 PROCEDURE — 90662 IIV NO PRSV INCREASED AG IM: CPT | Performed by: INTERNAL MEDICINE

## 2017-10-10 RX ORDER — POTASSIUM CHLORIDE 1500 MG/1
20 TABLET, EXTENDED RELEASE ORAL DAILY
Qty: 90 TABLET | Refills: 3 | Status: SHIPPED | OUTPATIENT
Start: 2017-10-10

## 2017-10-10 RX ORDER — LOSARTAN POTASSIUM AND HYDROCHLOROTHIAZIDE 12.5; 5 MG/1; MG/1
1 TABLET ORAL DAILY
Qty: 90 TABLET | Refills: 3 | Status: SHIPPED | OUTPATIENT
Start: 2017-10-10

## 2017-10-10 RX ORDER — ALBUTEROL SULFATE 90 UG/1
2 AEROSOL, METERED RESPIRATORY (INHALATION) EVERY 4 HOURS PRN
Qty: 1 INHALER | Refills: 11 | Status: SHIPPED | OUTPATIENT
Start: 2017-10-10

## 2017-10-10 RX ORDER — MONTELUKAST SODIUM 10 MG/1
10 TABLET ORAL AT BEDTIME
Qty: 90 TABLET | Refills: 1 | Status: SHIPPED | OUTPATIENT
Start: 2017-10-10

## 2017-10-10 RX ORDER — PRAVASTATIN SODIUM 40 MG
40 TABLET ORAL AT BEDTIME
Qty: 90 TABLET | Refills: 3 | Status: SHIPPED | OUTPATIENT
Start: 2017-10-10

## 2017-10-10 NOTE — MR AVS SNAPSHOT
"              After Visit Summary   10/10/2017    Ramez Fairchild    MRN: [id number]           Patient Information     Date Of Birth          1950        Visit Information        Provider Department      10/10/2017 1:20 PM Anand [facility] MD [facility]        Today's Diagnoses     Medicare annual wellness visit, initial    -  1    Need for prophylactic vaccination and inoculation against influenza        Chronic cough        Essential hypertension, benign        Hyperlipidemia LDL goal <130        Bronchospasm          Care Instructions      *  Continue all medications at the same doses.  Contact your usual pharmacy if you need refills.     *    5 GOALS TO PREVENT VASCULAR DISEASE:     1.  Aggressive blood pressure control, under 130/80 ideally.  Using medications if needed.    Your blood pressure is under good control    BP Readings from Last 4 Encounters:   10/10/17 130/82   09/07/17 122/82   08/25/17 138/84   07/18/17 138/88       2.  Aggressive LDL cholesterol (\"bad cholesterol\") lowering as indicated.    Your goal is an LDL under 130 for sure, preferably under 100.  (If you have diabetes or previous vascular disease, the the LDL goals would be under 100 for sure, preferably under 70.)    New guidelines identify four high-risk groups who could benefit from statins:   *people with pre-existing heart disease, such as those who have had a heart attack;   *people ages 40 to 75 who have diabetes of any type  *patients ages 40 to 75 with at least a 7.5% risk of developing cardiovascular disease over the next decade, according to a formula described in the guidelines  *patients with the sort of super-high cholesterol that sometimes runs in families, as evidenced by an LDL of 190 milligrams per deciliter or higher    Your cholesterol levels are well controlled.    Recent Labs   Lab Test  10/03/17   1001  11/10/16   0814  09/24/15   0817  03/04/15   0737   CHOL  194  185  182  168 " "  HDL  54  42  42  41   LDL  119*  121*  113  111   TRIG  107  111  135  78   CHOLHDLRATIO   --    --   4.3  4.1       3.  Aggressive diabetic prevention, screening and/or management.      You do not have diabetes as of the most recent blood tests.     4.  No smoking    5.  Consider taking low dose aspirin (81 mg) tablet once per day over the age of 50, every day unless there is a specific reason that you cannot take aspirin (such as side effect, allergy, or you are on another \"blood thinner\").        --Based on your current cardiac risk factors, you should take Aspirin 81 mg once per day if you are over 50 years of age.           Preventive Health Recommendations:       Male Ages 65 and over    Yearly exam:             See your health care provider every year in order to  o   Review health changes.   o   Discuss preventive care.    o   Review your medicines if your doctor has prescribed any.    Talk with your health care provider about whether you should have a test to screen for prostate cancer (PSA).    Every 3 years, have a diabetes test (fasting glucose). If you are at risk for diabetes, you should have this test more often.    Every 5 years, have a cholesterol test. Have this test more often if you are at risk for high cholesterol or heart disease.     Every 10 years, have a colonoscopy. Or, have a yearly FIT test (stool test). These exams will check for colon cancer.    Talk to with your health care provider about screening for Abdominal Aortic Aneurysm if you have a family history of AAA or have a history of smoking.  Shots:     Get a flu shot each year.     Get a tetanus shot every 10 years.     Talk to your doctor about your pneumonia vaccines. There are now two you should receive - Pneumovax (PPSV 23) and Prevnar (PCV 13).    Talk to your doctor about a shingles vaccine.     Talk to your doctor about the hepatitis B vaccine.  Nutrition:     Eat at least 5 servings of fruits and vegetables each day. " "    Eat whole-grain bread, whole-wheat pasta and brown rice instead of white grains and rice.     Talk to your doctor about Calcium and Vitamin D.        --Good Grains:  Oats, brown rice, Quinoa (these do not raise the blood sugar as much)     --Bad grains:  Anything made from wheat or white rice     (because these raise the blood sugars significantly, and the possible gluten issue from wheat for some people).      --Proteins:  Aim for \"lean proteins\" including chicken, fish, seafood, pork, turkey, and eggs (in moderation); Eat red meat only occasionally        Dwain Montgomery:                Lifestyle    Exercise for at least 150 minutes a week (30 minutes a day, 5 days a week). This will help you control your weight and prevent disease.     Limit alcohol to one drink per day.     No smoking.     Wear sunscreen to prevent skin cancer.     See your dentist every six months for an exam and cleaning.     See your eye doctor every 1 to 2 years to screen for conditions such as glaucoma, macular degeneration and cataracts.    Service Typically covered by medicare Recommended Completed   Vaccines    Pneumococcal     Once for patients after age 65       Influenza   Yearly       Hepatitis B           (if medium/high risk) Medium/high risk factors:    End Stage Kidney Disease    Hemophiliacs who received Factor XIII or IX concentrates    Clients of institutions for developmentally disabled    Persons who live in same house as a Hepatitis B carrier    Homosexual men    Illicit injectable drug users    Health care workers    Staff of institutions for developmentally disabled     Mammogram Covered: One-time screen between age 35-39, annually for age 40+     Pap and Pelvic Exam Covered: Annually if  high risk,  or childbearing age with abnormal Pap in last 3 years.  Q24 months for all other women     Prostate Cancer Screening    Digital rectal exam    PSA Covered: Annually for all men > age 50     Colorectal Cancer Screening " Screening colonoscopy every 10 years, more often for high risk patients     Diabetes self-management training Requires referral by treating physician for patient with diabetes     Diabetes screening    Fasting blood sugar or glucose tolerance test Once yearly, twice yearly if prediabetic     Cardiovascular screening blood tests    Total cholesterol    HDL    Triglycerides Every 5 years     Medical nutrition therapy for diabetes or renal disease Requires referral by treating physician for patient with diabetes or kidney disease     Glaucoma screening Annually for patients with one of the following risk factors:    Diabetes mellitus    Family history of glaucoma    -American age 50 and over    -American age 65 and over     Bone mass measurement Every 24 months if one of the following risk factors:    Estrogen deficiency    Vertebral abnormalities on x-ray indicative of osteoporosis, osteopenia, or vertebral fracture    Receiving/expected to receive the equivalent of at least 5 mg of Prednisone per day for > 3 months.    Hyperparathyroidism    Patient being monitored for response to osteoporosis therapy     One-time AAA screen  Must be ordered as part of Medicare IPPE   Any patient with a family history of AAA    Males Age 65-75, with history of smoking at least 100 cigarettes in lifetime     Smoking Cessation Counseling Beneficiaries who use tobacco are eligible to receive 2 cessation attempts per year; each attempt includes maximum of 4 sessions.     HIV Screening Annually for beneficiaries at increased risk:       Increased risk for HIV infection is defined in the  National Coverage Determinations (NCD) Manual,  Publication 100-03 Sections 190.14 (diagnostic) and 210.7 (screening). See http://www.cms.gov/manuals/downloads/tkn209n7_Vnlb8.pdf and http://www.cms.gov/manuals/downloads/lhy455p3_Gvap6.pdf on the Internet.  Three times per pregnancy for beneficiaries who are pregnant.       Future Annual  "Wellness Visit Annually, for all beneficiaries.                      Follow-ups after your visit        Who to contact     If you have questions or need follow up information about today's clinic visit or your schedule please contact [facility] directly at [phone].  Normal or non-critical lab and imaging results will be communicated to you by MyChart, letter or phone within 4 business days after the clinic has received the results. If you do not hear from us within 7 days, please contact the clinic through AddressReporthart or phone. If you have a critical or abnormal lab result, we will notify you by phone as soon as possible.  Submit refill requests through [employer] or call your pharmacy and they will forward the refill request to us. Please allow 3 business days for your refill to be completed.          Additional Information About Your Visit        MyChart Information     [employer] gives you secure access to your electronic health record. If you see a primary care provider, you can also send messages to your care team and make appointments. If you have questions, please call your primary care clinic.  If you do not have a primary care provider, please call [phone] and they will assist you.        Care EveryWhere ID     This is your Care EveryWhere ID. This could be used by other organizations to access your [facility] medical records  [id number]        Your Vitals Were     Pulse Temperature Height Pulse Oximetry BMI (Body Mass Index)       66 98.6  F (37  C) (Oral) 6' 4\" (1.93 m) 98% 26 kg/m2        Blood Pressure from Last 3 Encounters:   10/10/17 130/82   09/07/17 122/82   08/25/17 138/84    Weight from Last 3 Encounters:   10/10/17 213 lb 9.6 oz (96.9 kg)   09/07/17 221 lb 8 oz (100.5 kg)   08/25/17 225 lb 14.4 oz (102.5 kg)              We Performed the Following     ADMIN INFLUENZA (For MEDICARE Patients ONLY) []     FLU VACCINE, INCREASED ANTIGEN, PRESV FREE, AGE 65+ [21780]     "      Where to get your medicines      These medications were sent to [id number] - [place], [place] - [address] [facility]  [address] [facility] [address]     Phone:  [phone]     albuterol 108 (90 BASE) MCG/ACT Inhaler    losartan-hydrochlorothiazide 50-12.5 MG per tablet    montelukast 10 MG tablet    potassium chloride SA 20 MEQ CR tablet    pravastatin 40 MG tablet          Primary Care Provider Office Phone # Fax #    Anand Alexander [phone]       [address] [facility] [address]        Equal Access to Services     [facility]: Hadii aad ku hadasho Soomaali, waaxda luqadaha, qaybta kaalmada adeegyada, ceci bennettin hayaan andie cabrera . So [facility] [phone].    ATENCIÓN: Si habla español, tiene a ospina disposición servicios gratuitos de asistencia lingüística. [facility] [phone].    We comply with applicable federal civil rights laws and [place] laws. We do not discriminate on the basis of race, color, national origin, age, disability, sex, sexual orientation, or gender identity.            Thank you!     Thank you for choosing [facility]  for your care. Our goal is always to provide you with excellent care. Hearing back from our patients is one way we can continue to improve our services. Please take a few minutes to complete the written survey that you may receive in the mail after your visit with us. Thank you!             Your Updated Medication List - Protect others around you: Learn how to safely use, store and throw away your medicines at www.disposemymeds.org.          This list is accurate as of: 10/10/17  2:18 PM.  Always use your most recent med list.                   Brand Name Dispense Instructions for use Diagnosis    ALAVERT PO      as needed    Rhinitis, allergic seasonal       albuterol 108 (90 BASE) MCG/ACT Inhaler    PROAIR HFA    1 Inhaler    Inhale 2 puffs into the lungs every 4 hours as  needed for shortness of breath / dyspnea or wheezing    Bronchospasm       aspirin 81 MG tablet      1 TABLET DAILY    Essential hypertension, benign       aspirin-acetaminophen-caffeine 250-250-65 MG per tablet    EXCEDRIN MIGRAINE     Take 1 tablet by mouth every 6 hours as needed        losartan-hydrochlorothiazide 50-12.5 MG per tablet    HYZAAR    90 tablet    Take 1 tablet by mouth daily    Essential hypertension, benign       montelukast 10 MG tablet    SINGULAIR    90 tablet    Take 1 tablet (10 mg) by mouth At Bedtime TAKE ONCE DAILY DURING THE ALLERGY SEASON(S)    Chronic cough       multivitamin, therapeutic Tabs tablet      Take 1 tablet by mouth daily        potassium chloride SA 20 MEQ CR tablet    KLOR-CON    90 tablet    Take 1 tablet (20 mEq) by mouth daily    Essential hypertension, benign       pravastatin 40 MG tablet    PRAVACHOL    90 tablet    Take 1 tablet (40 mg) by mouth At Bedtime    Hyperlipidemia LDL goal <130       SAW PALMETTO PO      Take 1 tablet by mouth daily        sildenafil 20 MG tablet    REVATIO    30 tablet    Take 2-3 tablets (40-60 mg) by mouth daily as needed Never use with nitroglycerin, terazosin or doxazosin.    Erectile dysfunction, unspecified erectile dysfunction type       triamcinolone 0.1 % cream    KENALOG     Apply sparingly once or twice per day as needed to affected area until the skin is better, then stop    Dermatitis       valACYclovir 1000 mg tablet    VALTREX    4 tablet    2 tablets twice per day for 2 doses as needed for cold sores    Oral herpes simplex infection       VITAMIN C PO      Take 500 mg by mouth daily

## 2017-10-10 NOTE — NURSING NOTE
"Chief Complaint   Patient presents with     Medicare Visit     pt is not fasting       Initial BP (!) 150/102  Pulse 66  Temp 98.6  F (37  C) (Oral)  Ht 6' 4\" (1.93 m)  Wt 213 lb 9.6 oz (96.9 kg)  SpO2 98%  BMI 26 kg/m2 Estimated body mass index is 26 kg/(m^2) as calculated from the following:    Height as of this encounter: 6' 4\" (1.93 m).    Weight as of this encounter: 213 lb 9.6 oz (96.9 kg).  Medication Reconciliation: complete   Irma Blevins CMA      "

## 2017-10-10 NOTE — PROGRESS NOTES
SUBJECTIVE:   Ramez Fairchild is a 67 year old male who presents for Preventive Visit.  Are you in the first 12 months of your Medicare coverage?  Yes,  Visual Acuity:  Right Eye: 20/25   Left Eye: 20/20  Both Eyes: 20/20    Physical   Annual:     Getting at least 3 servings of Calcium per day::  Yes    Bi-annual eye exam::  Yes    Dental care twice a year::  Yes    Sleep apnea or symptoms of sleep apnea::  None    Diet::  Regular (no restrictions)    Frequency of exercise::  None    Taking medications regularly::  Yes    Medication side effects::  Not applicable    Additional concerns today::  No      COGNITIVE SCREEN  1) Repeat 3 items (Banana, Sunrise, Chair)    2) Clock draw: NORMAL  3) 3 item recall: Recalls 3 objects  Results: 3 items recalled: COGNITIVE IMPAIRMENT LESS LIKELY    Mini-CogTM Copyright S Fly. Licensed by the author for use in [employer]; reprinted with permission ([email or web address]). All rights reserved.      1.    Blood presure remains well controlled at home  Readings outside clinic are within normal limits.  Reviewed last 6 BP readings in chart:  BP Readings from Last 6 Encounters:   10/10/17 130/82   09/07/17 122/82   08/25/17 138/84   07/18/17 138/88   03/06/17 122/76   11/17/16 126/78     He has not experienced any significant side effects from medicaiotns for hypertension.    NO active cardiac complaints or symptoms with exercise.     2.  Has history of hyperlipidemia.  On statin for this, denies any significant side effects of this medication.      Latest labs reviewed:    Recent Labs   Lab Test  10/03/17   1001  11/10/16   0814  09/24/15   0817  03/04/15   0737   CHOL  194  185  182  168   HDL  54  42  42  41   LDL  119*  121*  113  111   TRIG  107  111  135  78   CHOLHDLRATIO   --    --   4.3  4.1        Lab Results   Component Value Date    AST 46 10/03/2017         Reviewed and updated as needed this visit by clinical staff  Tobacco  Allergies  Med Hx  Surg Hx  Fam  Hx         Reviewed and updated as needed this visit by Provider  Med Hx  Surg Hx  Fam Hx        Social History   Substance Use Topics     Smoking status: Never Smoker     Smokeless tobacco: Never Used     Alcohol use Yes      Comment: socially on weekend       The patient does not drink >3 drinks per day nor >7 drinks per week.        1.  Recent episode of diverticulitis hgas mostly resovled, BMs back to normal.       Today's PHQ-2 Score:   PHQ-2 ( 1999 Pf[employer]) 10/10/2017   Q1: Little interest or pleasure in doing things 0   Q2: Feeling down, depressed or hopeless 0   PHQ-2 Score 0   Q1: Little interest or pleasure in doing things Not at all   Q2: Feeling down, depressed or hopeless Not at all   PHQ-2 Score 0       Do you feel safe in your environment - Yes    Do you have a Health Care Directive?: Yes: Patient states has Advance Directive and will bring in a copy to clinic.    Current providers sharing in care for this patient include:   Patient Care Team:  Anand Alexander MD as PCP - General      Hearing impairment: No    Ability to successfully perform activities of daily living: Yes, no assistance needed     Fall risk:  Fallen 2 or more times in the past year?: No  Any fall with injury in the past year?: No      Home safety:  none identified      The following health maintenance items are reviewed in Epic and correct as of today:  Health Maintenance   Topic Date Due     ADVANCE DIRECTIVE PLANNING Q5 YRS  11/08/2016     INFLUENZA VACCINE (SYSTEM ASSIGNED)  09/01/2017     PNEUMOCOCCAL (2 of 2 - PPSV23) 07/12/2018     FALL RISK ASSESSMENT  07/18/2018     TETANUS IMMUNIZATION (SYSTEM ASSIGNED)  02/02/2020     COLON CANCER SCREEN (SYSTEM ASSIGNED)  09/17/2022     LIPID SCREEN Q5 YR MALE (SYSTEM ASSIGNED)  10/03/2022     AORTIC ANEURYSM SCREENING (SYSTEM ASSIGNED)  Completed     HEPATITIS C SCREENING  Completed             ROS:  Constitutional, HEENT, cardiovascular, pulmonary, gi and gu systems are  "negative, except as otherwise noted.      OBJECTIVE:   /82  Pulse 66  Temp 98.6  F (37  C) (Oral)  Ht 6' 4\" (1.93 m)  Wt 213 lb 9.6 oz (96.9 kg)  SpO2 98%  BMI 26 kg/m2 Estimated body mass index is 26 kg/(m^2) as calculated from the following:    Height as of this encounter: 6' 4\" (1.93 m).    Weight as of this encounter: 213 lb 9.6 oz (96.9 kg).  EXAM:   GENERAL alert and no distress.  EYES conjunctivae/corneas clear. PERRL, EOM's intact  HENT: NCAT,oral and posterior pharynx without lesions or erythema, facies symmetric  NECK: Neck supple. No LAD, without thyroidmegaly or JVD.  RESP: Clear to ausculation bilaterally without wheezes or crackles. Normal BS in all fields.  CV: RRR normal S1S2 without  murmurs, rubs or gallops. PMI normal  LYMPH: no cervical lymph adenopathy appreciated  GI: NTND, no organomegaly, normal BS in all quadrants, without rebound or guarding  MS: No cyanosis, clubbing or edema noted bilaterally in Upper and/or Lower Extremities  SKIN: no significant ulcers, lesions or rashes on the visualized portions of the skin  NEURO: Alert and Oriented x 3, Gait normal. Reflexes normal and symmetric. Sensation grossly WNL..  PSYCH: Alert and oriented times 3; speech- coherent , normal rate and volume; able to articulate logical thoughts, able to abstract reason, no tangential thoughts, no hallucinations or delusions, affect- normal     ASSESSMENT / PLAN:     (Z00.00) Medicare annual wellness visit, initial  (primary encounter diagnosis)  Comment: Discussed cardiac disease risk factor modification including screening for and treating HTN, lipids, DM, and smoking cessation.  Also discussed age appropriate cancer screening recommendations including testicular, prostate, colon and lung cancer as dictated by age group.  Recommended low fat, low salt diet and moderation in any alcohol intake.  Recommended always using seatbelts when in a car.  Recommended never driving after drinking or riding " with someone who has been drinking as well.       Plan:     (Z23) Need for prophylactic vaccination and inoculation against influenza  Comment:   Plan: FLU VACCINE, INCREASED ANTIGEN, PRESV FREE, AGE        65+ [33578], ADMIN INFLUENZA (For MEDICARE         Patients ONLY) []            (R05) Chronic cough  Comment:   Plan: montelukast (SINGULAIR) 10 MG tablet            (I10) Essential hypertension, benign  Comment: This condition is currently controlled on the current medical regimen.  Continue current therapy.   Discussed hypertension in detail including JNC VIII guidelines for blood pressure goals.  Discussed indication for treatment and treatment options.  Discussed the importance for aggressive management of HTN to prevent vascular complications later.  Recommended lower fat, lower carbohydrate, and lower sodium (<2000 mg)diet.  Discussed required intervals for follow up on HTN, lab studies, and the need to aggresive management of other cardiac disease risk factors.  Recommened pt. follow their blood pressures outside the clinic to ensure that BPs are remaining within guidelines, and to contact me if the readings are not within guidelines so we can adjust treatment as needed.   Plan: losartan-hydrochlorothiazide (HYZAAR) 50-12.5         MG per tablet, potassium chloride SA (KLOR-CON)        20 MEQ CR tablet            (E78.5) Hyperlipidemia LDL goal <130  Comment: Discussed current lipid results, previous results (if available) current guidelines (NCEP) for treatment and goals for lipids.  Discussed lifestyle modification, dietary changes (low fat, low simple carb) and regular aerobic exercise.  Discussed the link between dysmetabolic syndrome and impaired glucose tolerance seen in certain patterns of lipids.  Briefly discussed medication used for lipid lowering, including the statins are their possible side effects of myalgias, rhabdomyolysis, and liver toxicity.   Plan: pravastatin (PRAVACHOL) 40 MG  "tablet            (J98.01) Bronchospasm  Comment: oicc use only.   Plan: albuterol (PROAIR HFA) 108 (90 BASE) MCG/ACT         Inhaler               End of Life Planning:  Patient currently has an advanced directive: Yes.  Practitioner is supportive of decision.    COUNSELING:  Reviewed preventive health counseling, as reflected in patient instructions       Regular exercise       Healthy diet/nutrition       Vision screening       Hearing screening       Dental care       Aspirin Prophylaxsis       Alcohol Use       Hepatitis C screening       Colon cancer screening       Prostate cancer screening        Estimated body mass index is 26 kg/(m^2) as calculated from the following:    Height as of this encounter: 6' 4\" (1.93 m).    Weight as of this encounter: 213 lb 9.6 oz (96.9 kg).     reports that he has never smoked. He has never used smokeless tobacco.        Appropriate preventive services were discussed with this patient, including applicable screening as appropriate for cardiovascular disease, diabetes, osteopenia/osteoporosis, and glaucoma.  As appropriate for age/gender, discussed screening for colorectal cancer, prostate cancer, breast cancer, and cervical cancer. Checklist reviewing preventive services available has been given to the patient.    Reviewed patients plan of care and provided an AVS. The Basic Care Plan (routine screening as documented in Health Maintenance) for Ramez meets the Care Plan requirement. This Care Plan has been established and reviewed with the Patient.    Counseling Resources:  ATP IV Guidelines  Pooled Cohorts Equation Calculator  Breast Cancer Risk Calculator  FRAX Risk Assessment  ICSI Preventive Guidelines  Dietary Guidelines for Americans, 2010  Huddle's MyPlate  ASA [id number]   Lung CA Screening    Anand Alexander MD  [facility]      Answers for HPI/ROS submitted by the patient on 10/10/2017   PHQ-2 Score: 0    Injectable Influenza " Immunization Documentation    1.  Is the person to be vaccinated sick today?  Recently had diverticulitis    2. Does the person to be vaccinated have an allergy to a component   of the vaccine?   No    3. Has the person to be vaccinated ever had a serious reaction   to influenza vaccine in the past?   No    4. Has the person to be vaccinated ever had Guillain-Barré syndrome?   No    Form completed by Irma Blevins [facility]

## 2017-10-10 NOTE — PATIENT INSTRUCTIONS
"  *  Continue all medications at the same doses.  Contact your usual pharmacy if you need refills.     *    5 GOALS TO PREVENT VASCULAR DISEASE:     1.  Aggressive blood pressure control, under 130/80 ideally.  Using medications if needed.    Your blood pressure is under good control    BP Readings from Last 4 Encounters:   10/10/17 130/82   09/07/17 122/82   08/25/17 138/84   07/18/17 138/88       2.  Aggressive LDL cholesterol (\"bad cholesterol\") lowering as indicated.    Your goal is an LDL under 130 for sure, preferably under 100.  (If you have diabetes or previous vascular disease, the the LDL goals would be under 100 for sure, preferably under 70.)    New guidelines identify four high-risk groups who could benefit from statins:   *people with pre-existing heart disease, such as those who have had a heart attack;   *people ages 40 to 75 who have diabetes of any type  *patients ages 40 to 75 with at least a 7.5% risk of developing cardiovascular disease over the next decade, according to a formula described in the guidelines  *patients with the sort of super-high cholesterol that sometimes runs in families, as evidenced by an LDL of 190 milligrams per deciliter or higher    Your cholesterol levels are well controlled.    Recent Labs   Lab Test  10/03/17   1001  11/10/16   0814  09/24/15   0817  03/04/15   0737   CHOL  194  185  182  168   HDL  54  42  42  41   LDL  119*  121*  113  111   TRIG  107  111  135  78   CHOLHDLRATIO   --    --   4.3  4.1       3.  Aggressive diabetic prevention, screening and/or management.      You do not have diabetes as of the most recent blood tests.     4.  No smoking    5.  Consider taking low dose aspirin (81 mg) tablet once per day over the age of 50, every day unless there is a specific reason that you cannot take aspirin (such as side effect, allergy, or you are on another \"blood thinner\").        --Based on your current cardiac risk factors, you should take Aspirin 81 mg " "once per day if you are over 50 years of age.           Preventive Health Recommendations:       Male Ages 65 and over    Yearly exam:             See your health care provider every year in order to  o   Review health changes.   o   Discuss preventive care.    o   Review your medicines if your doctor has prescribed any.    Talk with your health care provider about whether you should have a test to screen for prostate cancer (PSA).    Every 3 years, have a diabetes test (fasting glucose). If you are at risk for diabetes, you should have this test more often.    Every 5 years, have a cholesterol test. Have this test more often if you are at risk for high cholesterol or heart disease.     Every 10 years, have a colonoscopy. Or, have a yearly FIT test (stool test). These exams will check for colon cancer.    Talk to with your health care provider about screening for Abdominal Aortic Aneurysm if you have a family history of AAA or have a history of smoking.  Shots:     Get a flu shot each year.     Get a tetanus shot every 10 years.     Talk to your doctor about your pneumonia vaccines. There are now two you should receive - Pneumovax (PPSV 23) and Prevnar (PCV 13).    Talk to your doctor about a shingles vaccine.     Talk to your doctor about the hepatitis B vaccine.  Nutrition:     Eat at least 5 servings of fruits and vegetables each day.     Eat whole-grain bread, whole-wheat pasta and brown rice instead of white grains and rice.     Talk to your doctor about Calcium and Vitamin D.        --Good Grains:  Oats, brown rice, Quinoa (these do not raise the blood sugar as much)     --Bad grains:  Anything made from wheat or white rice     (because these raise the blood sugars significantly, and the possible gluten issue from wheat for some people).      --Proteins:  Aim for \"lean proteins\" including chicken, fish, seafood, pork, turkey, and eggs (in moderation); Eat red meat only occasionally        Dwain Montgomery:       "          Lifestyle    Exercise for at least 150 minutes a week (30 minutes a day, 5 days a week). This will help you control your weight and prevent disease.     Limit alcohol to one drink per day.     No smoking.     Wear sunscreen to prevent skin cancer.     See your dentist every six months for an exam and cleaning.     See your eye doctor every 1 to 2 years to screen for conditions such as glaucoma, macular degeneration and cataracts.    Service Typically covered by medicare Recommended Completed   Vaccines    Pneumococcal     Once for patients after age 65       Influenza   Yearly       Hepatitis B           (if medium/high risk) Medium/high risk factors:    End Stage Kidney Disease    Hemophiliacs who received Factor XIII or IX concentrates    Clients of institutions for developmentally disabled    Persons who live in same house as a Hepatitis B carrier    Homosexual men    Illicit injectable drug users    Health care workers    Staff of institutions for developmentally disabled     Mammogram Covered: One-time screen between age 35-39, annually for age 40+     Pap and Pelvic Exam Covered: Annually if  high risk,  or childbearing age with abnormal Pap in last 3 years.  Q24 months for all other women     Prostate Cancer Screening    Digital rectal exam    PSA Covered: Annually for all men > age 50     Colorectal Cancer Screening Screening colonoscopy every 10 years, more often for high risk patients     Diabetes self-management training Requires referral by treating physician for patient with diabetes     Diabetes screening    Fasting blood sugar or glucose tolerance test Once yearly, twice yearly if prediabetic     Cardiovascular screening blood tests    Total cholesterol    HDL    Triglycerides Every 5 years     Medical nutrition therapy for diabetes or renal disease Requires referral by treating physician for patient with diabetes or kidney disease     Glaucoma screening Annually for patients with one of the  following risk factors:    Diabetes mellitus    Family history of glaucoma    -American age 50 and over    -American age 65 and over     Bone mass measurement Every 24 months if one of the following risk factors:    Estrogen deficiency    Vertebral abnormalities on x-ray indicative of osteoporosis, osteopenia, or vertebral fracture    Receiving/expected to receive the equivalent of at least 5 mg of Prednisone per day for > 3 months.    Hyperparathyroidism    Patient being monitored for response to osteoporosis therapy     One-time AAA screen  Must be ordered as part of Medicare IPPE   Any patient with a family history of AAA    Males Age 65-75, with history of smoking at least 100 cigarettes in lifetime     Smoking Cessation Counseling Beneficiaries who use tobacco are eligible to receive 2 cessation attempts per year; each attempt includes maximum of 4 sessions.     HIV Screening Annually for beneficiaries at increased risk:       Increased risk for HIV infection is defined in the  National Coverage Determinations (NCD) Manual,  Publication 100-03 Sections 190.14 (diagnostic) and 210.7 (screening). See http://www.cms.gov/manuals/downloads/rny152s5_Njvc2.pdf and http://www.cms.gov/manuals/downloads/uad882p1_Pjjd0.pdf on the Internet.  Three times per pregnancy for beneficiaries who are pregnant.       Future Annual Wellness Visit Annually, for all beneficiaries.

## 2017-12-05 ENCOUNTER — MYC REFILL (OUTPATIENT)
Dept: INTERNAL MEDICINE | Facility: CLINIC | Age: 67
End: 2017-12-05

## 2017-12-05 DIAGNOSIS — B00.2 ORAL HERPES SIMPLEX INFECTION: ICD-10-CM

## 2017-12-05 RX ORDER — VALACYCLOVIR HYDROCHLORIDE 1 G/1
TABLET, FILM COATED ORAL
Qty: 4 TABLET | Refills: 9 | Status: SHIPPED | OUTPATIENT
Start: 2017-12-05 | End: 2019-04-01

## 2017-12-05 NOTE — TELEPHONE ENCOUNTER
Message from [employer]:  Original authorizing provider: MD Ramez Reed would like a refill of the following medications:  valACYclovir (VALTREX) 1000 mg tablet [Anand Alexander MD]    Preferred pharmacy: [facility], [address] [place] [id number]    Comment:

## 2018-02-28 ENCOUNTER — TELEPHONE (OUTPATIENT)
Dept: LAB | Facility: CLINIC | Age: 68
End: 2018-02-28

## 2018-02-28 DIAGNOSIS — I10 ESSENTIAL HYPERTENSION, BENIGN: ICD-10-CM

## 2018-02-28 DIAGNOSIS — E78.5 HYPERLIPIDEMIA LDL GOAL <130: ICD-10-CM

## 2018-02-28 NOTE — TELEPHONE ENCOUNTER
He does not need to return for labs until October.     We do not need to do the PSA blood test every year at this time, it is OK to do every other year.   Will do at next blood draw in October, sooner if needed.

## 2018-02-28 NOTE — LETTER
[facility]  [address] [address]  [phone]        February 11, 2019    Ramez Fairchild  [address] [place] [id number]              Dear Ramez Fairchild    This is to remind you that your fasting labs is due.    You may call our office at [phone] to schedule an appointment.    Please disregard this notice if you have already had your labs drawn or made an appointment.        Sincerely,        Anand Alexander MD

## 2019-04-01 DIAGNOSIS — B00.2 ORAL HERPES SIMPLEX INFECTION: ICD-10-CM

## 2019-04-01 NOTE — TELEPHONE ENCOUNTER
"Requested Prescriptions   Pending Prescriptions Disp Refills     valACYclovir (VALTREX) 1000 mg tablet [Pharmacy Med Name: ValACYclovir 1GM    TAB] 4 tablet 9     Sig: TAKE TWO TABLETS BY MOUTH TWICE DAILY AS NEEDED FOR  COLD  SORES    Antivirals for Herpes Protocol Failed - 4/1/2019  2:15 PM       Failed - Recent (12 mo) or future (30 days) visit within the authorizing provider's specialty    Patient had office visit in the last 12 months or has a visit in the next 30 days with authorizing provider or within the authorizing provider's specialty.  See \"Patient Info\" tab in inbasket, or \"Choose Columns\" in Meds & Orders section of the refill encounter.             Failed - Normal serum creatinine on file in past 12 months    Recent Labs   Lab Test 10/03/17  1001   CR 0.98            Passed - Patient is age 12 or older       Passed - Medication is active on med list        Last Written Prescription Date:  12/5/17  Last Fill Quantity: 4,  # refills: 9   Last office visit: 10/10/2017 with prescribing provider:  10/10/17   Future Office Visit:      "

## 2019-04-03 RX ORDER — VALACYCLOVIR HYDROCHLORIDE 1 G/1
TABLET, FILM COATED ORAL
Qty: 8 TABLET | Refills: 9 | Status: SHIPPED | OUTPATIENT
Start: 2019-04-03

## 2019-10-03 ENCOUNTER — HEALTH MAINTENANCE LETTER (OUTPATIENT)
Age: 69
End: 2019-10-03

## 2020-02-08 ENCOUNTER — HEALTH MAINTENANCE LETTER (OUTPATIENT)
Age: 70
End: 2020-02-08

## 2020-11-07 ENCOUNTER — HEALTH MAINTENANCE LETTER (OUTPATIENT)
Age: 70
End: 2020-11-07

## 2021-03-21 ENCOUNTER — HEALTH MAINTENANCE LETTER (OUTPATIENT)
Age: 71
End: 2021-03-21

## 2021-09-05 ENCOUNTER — HEALTH MAINTENANCE LETTER (OUTPATIENT)
Age: 71
End: 2021-09-05

## 2022-04-17 ENCOUNTER — HEALTH MAINTENANCE LETTER (OUTPATIENT)
Age: 72
End: 2022-04-17

## 2022-10-23 ENCOUNTER — HEALTH MAINTENANCE LETTER (OUTPATIENT)
Age: 72
End: 2022-10-23

## 2023-06-01 ENCOUNTER — HEALTH MAINTENANCE LETTER (OUTPATIENT)
Age: 73
End: 2023-06-01